# Patient Record
Sex: MALE | Race: WHITE | NOT HISPANIC OR LATINO | URBAN - METROPOLITAN AREA
[De-identification: names, ages, dates, MRNs, and addresses within clinical notes are randomized per-mention and may not be internally consistent; named-entity substitution may affect disease eponyms.]

---

## 2018-08-02 ENCOUNTER — OFFICE VISIT (OUTPATIENT)
Dept: FAMILY MEDICINE CLINIC | Facility: CLINIC | Age: 28
End: 2018-08-02

## 2018-08-02 VITALS
HEART RATE: 63 BPM | WEIGHT: 149.3 LBS | SYSTOLIC BLOOD PRESSURE: 124 MMHG | DIASTOLIC BLOOD PRESSURE: 78 MMHG | HEIGHT: 70 IN | BODY MASS INDEX: 21.37 KG/M2 | TEMPERATURE: 98.5 F | OXYGEN SATURATION: 99 %

## 2018-08-02 DIAGNOSIS — Z00.00 HEALTH CARE MAINTENANCE: ICD-10-CM

## 2018-08-02 DIAGNOSIS — J45.20 MILD INTERMITTENT ASTHMATIC BRONCHITIS WITHOUT COMPLICATION: Primary | ICD-10-CM

## 2018-08-02 PROCEDURE — 99203 OFFICE O/P NEW LOW 30 MIN: CPT | Performed by: FAMILY MEDICINE

## 2018-08-02 RX ORDER — ALBUTEROL SULFATE 90 UG/1
2 AEROSOL, METERED RESPIRATORY (INHALATION) EVERY 4 HOURS PRN
Qty: 1 INHALER | Refills: 2 | Status: SHIPPED | OUTPATIENT
Start: 2018-08-02 | End: 2018-10-26

## 2018-08-02 RX ORDER — PREDNISONE 20 MG/1
40 TABLET ORAL DAILY
Qty: 10 TABLET | Refills: 0 | Status: SHIPPED | OUTPATIENT
Start: 2018-08-02 | End: 2018-10-26

## 2018-08-02 NOTE — PROGRESS NOTES
"Subjective   Elijah Fisher is a 27 y.o. male.     Chief Complaint   Patient presents with   • Establish Care     reestablish care    • URI     x sat         History of Present Illness    Bronchitis with dry cough and tightness in the chest since about 5 days ago.  No fever.  No sore throat now but may be then.  He's been exposed to a lot of dust.  Also he was cleaning his house of fleas using a \"bug bomb\".  He does smoke 2 cigarettes a day.  No history of asthma.  Otherwise feeling well.  Overall feeling low but better now than a few days ago.      The following portions of the patient's history were reviewed and updated as appropriate: allergies, current medications, past family history, past medical history, past social history, past surgical history and problem list.          Review of Systems   Constitutional: Negative.    HENT: Negative.    Respiratory: Positive for cough and wheezing. Negative for shortness of breath.    Cardiovascular: Negative.    Musculoskeletal: Negative.        Objective   Blood pressure 124/78, pulse 63, temperature 98.5 °F (36.9 °C), temperature source Oral, height 177.8 cm (70\"), weight 67.7 kg (149 lb 4.8 oz), SpO2 99 %.  Physical Exam   Constitutional: No distress.   No acute distress.  Nontoxic.   HENT:   Right Ear: Tympanic membrane, external ear and ear canal normal.   Left Ear: Tympanic membrane, external ear and ear canal normal.   Nose: Nose normal.   Mouth/Throat: Oropharynx is clear and moist. No oropharyngeal exudate.   Eyes: Conjunctivae are normal. Right eye exhibits no discharge. Left eye exhibits no discharge. No scleral icterus.   Cardiovascular: Normal rate.    Pulmonary/Chest: Effort normal. No stridor. No respiratory distress. He has wheezes. He has no rales.   No tachypnea.  Mild expiratory wheeze with good air movement   Lymphadenopathy:     He has no cervical adenopathy.   Skin: No rash noted.   Nursing note and vitals reviewed.      Assessment/Plan   Elijah was seen " today for establish care and uri.    Diagnoses and all orders for this visit:    Mild intermittent asthmatic bronchitis without complication    Health care maintenance  -     CBC & Differential; Future  -     Comprehensive Metabolic Panel; Future  -     Lipid Panel; Future    Other orders  -     predniSONE (DELTASONE) 20 MG tablet; Take 2 tablets by mouth Daily.  -     albuterol (PROAIR HFA) 108 (90 Base) MCG/ACT inhaler; Inhale 2 puffs Every 4 (Four) Hours As Needed for Wheezing.      Asthmatic bronchitis.  Viral versus irritant.  Possible undiagnosed asthma.  Recommend smoking cessation.  Prednisone 40 mg a for 5 days.  Side effects of his own discussed.  Albuterol inhaler.  There is currently no evidence of pneumonia.  With worse symptoms seek medical attention immediately.  I'll see him back within 3 months for follow-up including complete physical examination.

## 2018-10-26 ENCOUNTER — OFFICE VISIT (OUTPATIENT)
Dept: FAMILY MEDICINE CLINIC | Facility: CLINIC | Age: 28
End: 2018-10-26

## 2018-10-26 VITALS
OXYGEN SATURATION: 100 % | BODY MASS INDEX: 21.42 KG/M2 | WEIGHT: 149.6 LBS | DIASTOLIC BLOOD PRESSURE: 74 MMHG | HEART RATE: 71 BPM | HEIGHT: 70 IN | SYSTOLIC BLOOD PRESSURE: 125 MMHG | TEMPERATURE: 98 F

## 2018-10-26 DIAGNOSIS — M67.479 GANGLION CYST OF FOOT: Primary | ICD-10-CM

## 2018-10-26 PROCEDURE — 99213 OFFICE O/P EST LOW 20 MIN: CPT | Performed by: FAMILY MEDICINE

## 2018-10-26 NOTE — PROGRESS NOTES
"Subjective   Elijah Fisher is a 28 y.o. male.     Chief Complaint   Patient presents with   • Foot Pain     on the top of right foot and sore x 2 wks        History of Present Illness    Right foot.  2 or 3 weeks.  A swollen area under the skin.  Then a blister on the skin.  Rubbing on his hiking boot.  He [place] a lot of hiking in the park.  He has not had this before.  No injury.  No swollen areas elsewhere.  No fever.  He used some mole skin around it and it got better.  He stopped the bandage and got worse      The following portions of the patient's history were reviewed and updated as appropriate: allergies, current medications, past family history, past medical history, past social history, past surgical history and problem list.          Review of Systems   Constitutional: Negative for fever.   Musculoskeletal: Negative for joint swelling.   Neurological: Negative for weakness and numbness.       Objective   Blood pressure 125/74, pulse 71, temperature 98 °F (36.7 °C), temperature source Oral, height 177.8 cm (70\"), weight 67.9 kg (149 lb 9.6 oz), SpO2 100 %.  Physical Exam   Constitutional: No distress.   Musculoskeletal:   Right midfoot dorsal, near the metatarsal mid foot joint, there is a three-quarter centimeter to 1 cm subcutaneous freely movable cystic-like nodule.  There is overlying abrasion and blister from rubbing on his boot.       Assessment/Plan   Elijah was seen today for foot pain.    Diagnoses and all orders for this visit:    Ganglion cyst of foot      Ganglion cyst right foot.  Overlying blister from abrasion.  I recommend he continue to use the Mole skin cut out bandage.  Should spontaneously improved.  If not, I gave the patient a name of a podiatrist to get to.  He will call with questions         "

## 2018-10-31 ENCOUNTER — RESULTS ENCOUNTER (OUTPATIENT)
Dept: FAMILY MEDICINE CLINIC | Facility: CLINIC | Age: 28
End: 2018-10-31

## 2018-10-31 DIAGNOSIS — Z00.00 HEALTH CARE MAINTENANCE: ICD-10-CM

## 2019-02-22 LAB
ALBUMIN SERPL-MCNC: 5.1 G/DL (ref 3.5–5.5)
ALBUMIN/GLOB SERPL: 2.2 {RATIO} (ref 1.2–2.2)
ALP SERPL-CCNC: 67 IU/L (ref 39–117)
ALT SERPL-CCNC: 31 IU/L (ref 0–44)
AST SERPL-CCNC: 22 IU/L (ref 0–40)
BASOPHILS # BLD AUTO: 0 X10E3/UL (ref 0–0.2)
BASOPHILS NFR BLD AUTO: 1 %
BILIRUB SERPL-MCNC: 0.7 MG/DL (ref 0–1.2)
BUN SERPL-MCNC: 14 MG/DL (ref 6–20)
BUN/CREAT SERPL: 16 (ref 9–20)
CALCIUM SERPL-MCNC: 9.7 MG/DL (ref 8.7–10.2)
CHLORIDE SERPL-SCNC: 102 MMOL/L (ref 96–106)
CHOLEST SERPL-MCNC: 200 MG/DL (ref 100–199)
CO2 SERPL-SCNC: 22 MMOL/L (ref 20–29)
CREAT SERPL-MCNC: 0.89 MG/DL (ref 0.76–1.27)
EOSINOPHIL # BLD AUTO: 0.2 X10E3/UL (ref 0–0.4)
EOSINOPHIL NFR BLD AUTO: 2 %
ERYTHROCYTE [DISTWIDTH] IN BLOOD BY AUTOMATED COUNT: 12.8 % (ref 12.3–15.4)
GLOBULIN SER CALC-MCNC: 2.3 G/DL (ref 1.5–4.5)
GLUCOSE SERPL-MCNC: 80 MG/DL (ref 65–99)
HCT VFR BLD AUTO: 46.7 % (ref 37.5–51)
HDLC SERPL-MCNC: 64 MG/DL
HGB BLD-MCNC: 15.8 G/DL (ref 13–17.7)
IMM GRANULOCYTES # BLD AUTO: 0 X10E3/UL (ref 0–0.1)
IMM GRANULOCYTES NFR BLD AUTO: 0 %
LDLC SERPL CALC-MCNC: 125 MG/DL (ref 0–99)
LYMPHOCYTES # BLD AUTO: 2.9 X10E3/UL (ref 0.7–3.1)
LYMPHOCYTES NFR BLD AUTO: 41 %
MCH RBC QN AUTO: 30.9 PG (ref 26.6–33)
MCHC RBC AUTO-ENTMCNC: 33.8 G/DL (ref 31.5–35.7)
MCV RBC AUTO: 91 FL (ref 79–97)
MONOCYTES # BLD AUTO: 0.4 X10E3/UL (ref 0.1–0.9)
MONOCYTES NFR BLD AUTO: 5 %
NEUTROPHILS # BLD AUTO: 3.5 X10E3/UL (ref 1.4–7)
NEUTROPHILS NFR BLD AUTO: 51 %
PLATELET # BLD AUTO: 277 X10E3/UL (ref 150–379)
POTASSIUM SERPL-SCNC: 4.3 MMOL/L (ref 3.5–5.2)
PROT SERPL-MCNC: 7.4 G/DL (ref 6–8.5)
RBC # BLD AUTO: 5.12 X10E6/UL (ref 4.14–5.8)
SODIUM SERPL-SCNC: 141 MMOL/L (ref 134–144)
TRIGL SERPL-MCNC: 55 MG/DL (ref 0–149)
VLDLC SERPL CALC-MCNC: 11 MG/DL (ref 5–40)
WBC # BLD AUTO: 7 X10E3/UL (ref 3.4–10.8)

## 2019-03-13 ENCOUNTER — TELEPHONE (OUTPATIENT)
Dept: FAMILY MEDICINE CLINIC | Facility: CLINIC | Age: 29
End: 2019-03-13

## 2019-03-13 NOTE — TELEPHONE ENCOUNTER
Pt was to see you last week but we was running behind and he had to leave. He said that he is working and lives over at [place] and wondering if you knew of another PCP in that area. Please advise

## 2019-11-08 ENCOUNTER — OFFICE VISIT (OUTPATIENT)
Dept: FAMILY MEDICINE CLINIC | Facility: CLINIC | Age: 29
End: 2019-11-08

## 2019-11-08 VITALS
WEIGHT: 152.8 LBS | TEMPERATURE: 97.1 F | HEIGHT: 70 IN | SYSTOLIC BLOOD PRESSURE: 143 MMHG | BODY MASS INDEX: 21.88 KG/M2 | OXYGEN SATURATION: 99 % | HEART RATE: 89 BPM | DIASTOLIC BLOOD PRESSURE: 85 MMHG

## 2019-11-08 DIAGNOSIS — R30.0 DYSURIA: Primary | ICD-10-CM

## 2019-11-08 LAB
BILIRUB BLD-MCNC: NEGATIVE MG/DL
CLARITY, POC: CLEAR
COLOR UR: YELLOW
GLUCOSE UR STRIP-MCNC: NEGATIVE MG/DL
KETONES UR QL: NEGATIVE
LEUKOCYTE EST, POC: NEGATIVE
NITRITE UR-MCNC: NEGATIVE MG/ML
PH UR: 7.5 [PH] (ref 5–8)
PROT UR STRIP-MCNC: ABNORMAL MG/DL
RBC # UR STRIP: NEGATIVE /UL
SP GR UR: 1.01 (ref 1–1.03)
UROBILINOGEN UR QL: NORMAL

## 2019-11-08 PROCEDURE — 99213 OFFICE O/P EST LOW 20 MIN: CPT | Performed by: FAMILY MEDICINE

## 2019-11-08 PROCEDURE — 81003 URINALYSIS AUTO W/O SCOPE: CPT | Performed by: FAMILY MEDICINE

## 2019-11-08 RX ORDER — DOXYCYCLINE HYCLATE 100 MG/1
100 CAPSULE ORAL 2 TIMES DAILY
Qty: 20 CAPSULE | Refills: 0 | Status: SHIPPED | OUTPATIENT
Start: 2019-11-08 | End: 2020-07-02

## 2019-11-08 NOTE — PROGRESS NOTES
"Subjective   Elijah Fisher is a 29 y.o. male.     Chief Complaint   Patient presents with   • Dysuria     x sun when he urinates having discomfort         History of Present Illness    Minor irritation with urination, urethra towards the tip of the penis, for about 5 days.  It was a little bit worse yesterday.  No risk factors for STDs.  He states [place] cabin and was in a hot tub prior to the symptoms.  He has no rash anywhere.  Including on his legs.  No illness no fever some frequency of urination but no urgency.  No troubles urinating.  No back pain or flank pain.  No photophobia.  No arthritis symptoms.  No rashes.  Mild nuisance level symptoms except for yesterday.  He states he has no risk factors for sexually transmitted diseases.      The following portions of the patient's history were reviewed and updated as appropriate: allergies, current medications, past family history, past medical history, past social history, past surgical history and problem list.          Review of Systems   Constitutional: Negative for fever.   Genitourinary: Positive for dysuria. Negative for hematuria.   Musculoskeletal: Negative for back pain.   Skin: Negative.        Objective   Blood pressure 143/85, pulse 89, temperature 97.1 °F (36.2 °C), temperature source Oral, height 177.8 cm (70\"), weight 69.3 kg (152 lb 12.8 oz), SpO2 99 %.  Physical Exam   Constitutional: No distress.   Genitourinary:   Genitourinary Comments: No testicular nodules or masses.  Epididymides nontender noninflamed.  No scrotal lesions.  There is a small sebaceous cyst, 3 or 4 mm, patient states is been there for quite some time in the right scrotum.  No hernia.  No adenopathy.  The glans penis is unremarkable.  There is no purulent drainage.  There is no urethral inflammation.     Urinalysis negative except for trace protein    Assessment/Plan   Elijah was seen today for dysuria.    Diagnoses and all orders for this visit:    Dysuria  -     POC " Urinalysis Dipstick, Automated  -     Chlamydia trachomatis, Neisseria gonorrhoeae, PCR - Urine, Urine, Clean Catch; Future  -     Chlamydia trachomatis, Neisseria gonorrhoeae, PCR - Urine, Urine, Clean Catch    Other orders  -     doxycycline (VIBRAMYCIN) 100 MG capsule; Take 1 capsule by mouth 2 (Two) Times a Day.      Nonspecific urethritis.  Minimal symptoms.  Slowly improving.  At this time I recommend observation.  Checking urine for gonorrhea and chlamydia.  If the symptoms get worse over the weekend start doxycycline.  If chlamydia positive start doxycycline.  If gonorrhea positive needs recheck and I am Rocephin.  Low risk for STD.  No evidence of yeast balanitis or urethritis.  Possibly related to irritation from the hot tub.  No evidence of hot tub folliculitis.  Plenty of fluids recommended.  Likely not renal lithiasis.

## 2019-11-11 LAB
C TRACH RRNA SPEC QL NAA+PROBE: NEGATIVE
N GONORRHOEA RRNA SPEC QL NAA+PROBE: NEGATIVE

## 2020-04-21 ENCOUNTER — TELEMEDICINE (OUTPATIENT)
Dept: FAMILY MEDICINE CLINIC | Facility: CLINIC | Age: 30
End: 2020-04-21

## 2020-04-21 DIAGNOSIS — K59.00 CONSTIPATION, UNSPECIFIED CONSTIPATION TYPE: Primary | ICD-10-CM

## 2020-04-21 PROCEDURE — 99213 OFFICE O/P EST LOW 20 MIN: CPT | Performed by: FAMILY MEDICINE

## 2020-04-21 NOTE — PROGRESS NOTES
Subjective   Elijah Fisher is a 29 y.o. male.     Chief Complaint   Patient presents with   • Constipation        History of Present Illness    Coronavirus pandemic telehealth visit.  Excellent audio and video connection.  Patient gave informed consent via the [employer] check in process.    Last week patient had some lower abdominal occasional discomfort.  Mild.  Will come and go.  He noted constipation throughout the week.  He eats a plant-based diet mostly.  There is been no major changes in his diet but he states he is not drinking enough water.  He otherwise has felt okay.  But then Sunday evening had more severe lower abdominal cramps.  No nausea.  No GERD symptoms.  No fever.  No chills.  No cough.  No shortness of breath.  No other concerning symptoms.  He took a magnesium laxative.  And then got cleaned out now is having loose stool not diarrhea for a couple of days.  But he feels overall much better.  No constitutional symptoms otherwise.  He is mostly concerned about the loose to watery stool now.  He has had a slight flareup of hemorrhoids since this began.  He is doing sitz bath's.      The following portions of the patient's history were reviewed and updated as appropriate: allergies, current medications, past family history, past medical history, past social history, past surgical history and problem list.          Review of Systems   Constitutional: Negative for fever.   HENT: Negative.    Respiratory: Negative.    Gastrointestinal: Positive for constipation and diarrhea.   Musculoskeletal: Negative.    Skin: Negative.    Neurological: Negative.    Psychiatric/Behavioral: Negative.        Objective   There were no vitals taken for this visit.  Physical Exam   Constitutional: He is oriented to person, place, and time. He appears well-nourished. No distress.   He appears in no acute distress.  Nontoxic.   HENT:   Head: Atraumatic.   Neck: Normal range of motion.   Pulmonary/Chest: Effort normal. No  respiratory distress.   Neurological: He is alert and oriented to person, place, and time. Coordination normal.   Skin: No pallor.   Psychiatric: He has a normal mood and affect.       Assessment/Plan   Elijah was seen today for constipation.    Diagnoses and all orders for this visit:    Constipation, unspecified constipation type      Constipation.  Resolved with a fairly powerful laxative.  Now having some loose stool, like related to the laxative.  I recommend plenty of fluids.  Continue his regular diet.  The looser stools should now dissipate.  If he develops any constitutional symptoms such as fever, chills, cough, or other symptoms he is going to let us know.  For the hemorrhoids I recommend sitz bath, Preparation H, or just Vaseline ointment.    Total duration of telehealth encounter 20 minutes.

## 2020-04-24 ENCOUNTER — TELEMEDICINE (OUTPATIENT)
Dept: FAMILY MEDICINE CLINIC | Facility: CLINIC | Age: 30
End: 2020-04-24

## 2020-04-24 DIAGNOSIS — R10.9 ABDOMINAL CRAMPS: Primary | ICD-10-CM

## 2020-04-24 PROCEDURE — 99213 OFFICE O/P EST LOW 20 MIN: CPT | Performed by: FAMILY MEDICINE

## 2020-04-24 NOTE — PATIENT INSTRUCTIONS
Per our conversation probable functional bowel disorder IBS-like symptoms.  I recommend a probiotic of choice.  Cut back the apples.  Okay for other fermented foods.  Symptoms are allowed to persist for about 3 weeks which should gradually get better.  With severe symptoms such as high fever, weight loss, or other concerning symptoms please let us know.

## 2020-04-24 NOTE — PROGRESS NOTES
Subjective   Elijah Fisher is a 29 y.o. male.     No chief complaint on file.    Chief complaint abdominal cramps    History of Present Illness     You have chosen to receive care through a telehealth visit.  Do you consent to use a video/audio connection for your medical care today? Yes    I saw the patient via telehealth earlier this week with some nonspecific constipation symptoms.  He had taken some magnesium citrate or something similar.  Got cleaned out pretty good and was having some looser stool it was concerning him.  There is been no changes in his diet other than he is been eating a lot of apples.  He drinks maybe a sixpack of beer a weekend but that is unchanged.  He has had some stressors but nothing considerable.  He had a hemorrhoid last week which is now better.  He is had no recurrent blood in the stool.  He has had some bloating and gassiness after eating.  Sometimes with healthy food.  At times it repeats and feels fine.  There is been no fevers or chills no night sweats no unexpected weight loss.  He otherwise feels well.  He has more significant lower cramps recently.      The following portions of the patient's history were reviewed and updated as appropriate: allergies, current medications, past family history, past medical history, past social history, past surgical history and problem list.          Review of Systems   Constitutional: Negative.    Gastrointestinal: Positive for constipation. Negative for blood in stool.   Psychiatric/Behavioral: Negative.        Objective   There were no vitals taken for this visit.  Physical Exam   Constitutional: He is oriented to person, place, and time.   The patient appears comfortable and no acute distress   HENT:   Head: Atraumatic.   Neck: Normal range of motion.   Pulmonary/Chest: Effort normal. No respiratory distress.   Neurological: He is alert and oriented to person, place, and time.   Skin: No pallor.   Psychiatric: He has a normal mood and  affect. His behavior is normal.       Assessment/Plan   Diagnoses and all orders for this visit:    Abdominal cramps      Intermittent abdominal cramps since use of magnesium for constipation.  Overall the symptoms are suggestive of irritable bowel syndrome or functional bowel disorder.  There is no red flag symptoms such as weight loss, severe blood in the stool, or other concerning symptoms.  At this time I do not see an absolute indication for work-up.  Less likely irritable bowel syndrome or celiac disease.  He has been eating a lot of apples.  I recommend a consideration lowering the amount of apples in his diet but he does not have to go to a low FODMAP diet.  I do recommend a probiotic of choice.  He can continue yogurt which he has not had trouble with in the past.  At this point symptoms should resolve within 3 weeks.  If they are getting worse or he has red flag symptoms such as weight loss, fever, severe pain, severe blood in the stool or other severe concerns go to let us know sooner.    Total duration of telehealth encounter approximately 15 minutes

## 2020-07-01 ENCOUNTER — TELEPHONE (OUTPATIENT)
Dept: FAMILY MEDICINE CLINIC | Facility: CLINIC | Age: 30
End: 2020-07-01

## 2020-07-01 NOTE — TELEPHONE ENCOUNTER
Prefer for appointment in office.  If he is having headaches but no URI symptoms, likely not COVID.

## 2020-07-01 NOTE — TELEPHONE ENCOUNTER
Patient is having headaches only when exercising and is causing some concern; because of headache he failed covid screening to come to an inpatient appt. Please call to advise at [phone]

## 2020-07-02 ENCOUNTER — OFFICE VISIT (OUTPATIENT)
Dept: FAMILY MEDICINE CLINIC | Facility: CLINIC | Age: 30
End: 2020-07-02

## 2020-07-02 VITALS
BODY MASS INDEX: 18.98 KG/M2 | HEART RATE: 107 BPM | TEMPERATURE: 97.8 F | SYSTOLIC BLOOD PRESSURE: 132 MMHG | HEIGHT: 70 IN | OXYGEN SATURATION: 97 % | DIASTOLIC BLOOD PRESSURE: 82 MMHG | WEIGHT: 132.6 LBS

## 2020-07-02 DIAGNOSIS — G44.84 PRIMARY EXERTIONAL HEADACHE: Primary | ICD-10-CM

## 2020-07-02 PROCEDURE — 99214 OFFICE O/P EST MOD 30 MIN: CPT | Performed by: FAMILY MEDICINE

## 2020-07-02 NOTE — PATIENT INSTRUCTIONS
Probable primary exertional headache.  Less likely early symptoms of a cerebral aneurysm.  However the standard of care for these type of headaches is CT angiography or MRI angiography.  I recommend CT angiography.  If you have recurrent symptoms I want to go directly to the emergency room.  However I think there is low probability of this being a life-threatening headache at this time.

## 2020-07-02 NOTE — PROGRESS NOTES
"Subjective   Elijah Fisher is a 29 y.o. male.     Chief Complaint   Patient presents with   • Headache     C/o of h/a while exercising, more serve during activity but has been on and off        History of Present Illness    In the last week 2 episodes of moderately to severe headache with exertion.  Right temporal.  Shooting to the right occiput.  Occurred both with intense exercise.  Once lifting weights doing biceps.  The other time doing push-ups.  However he had some other exertional activities outside of the gym that were similar but with only a mild headache.  Otherwise no headache.  He does get migraine headaches about twice year that are very severe and throbbing.  He states those are different than this.  This is sharp.  Comes on fairly slowly.  Then goes away slowly.  He has had no diplopia.  No visual change.  No visual loss.  No nausea.  No vomiting.  No focal neurological deficit.  No gait problems.  Some possible nocturnal symptoms.  No headache with sex.  He does smoke cigarettes.  Blood pressures been borderline elevated the office before.  There is no family history of cerebral aneurysm.  Patient is pain-free now.  He is anxious about this.      The following portions of the patient's history were reviewed and updated as appropriate: allergies, current medications, past family history, past medical history, past social history, past surgical history and problem list.          Review of Systems   Constitutional: Negative.    HENT: Negative for postnasal drip, sinus pressure and sinus pain.    Eyes: Negative.    Respiratory: Negative.    Cardiovascular: Negative.    Neurological: Positive for headaches. Negative for dizziness, tremors, seizures, syncope, facial asymmetry, speech difficulty, weakness, light-headedness and numbness.   Psychiatric/Behavioral: Negative.        Objective   Blood pressure 132/82, pulse 107, temperature 97.8 °F (36.6 °C), temperature source Tympanic, height 177.8 cm (70\"), " weight 60.1 kg (132 lb 9.6 oz), SpO2 97 %.  Physical Exam   Constitutional: He is oriented to person, place, and time. No distress.   No acute distress.  Nontoxic.   HENT:   Head: Head is with Sanchez's sign.   Right Ear: Tympanic membrane, external ear and ear canal normal.   Left Ear: Tympanic membrane, external ear and ear canal normal.   Nose: Nose normal.   Mouth/Throat: Oropharynx is clear and moist. No oropharyngeal exudate.   No tenderness palpation of the scalp.  No scalp lesions.  No neck discomfort to palpation   Eyes: Pupils are equal, round, and reactive to light. Conjunctivae are normal. Right eye exhibits no discharge. Left eye exhibits no discharge. No scleral icterus.   Cardiovascular: Normal rate.   Pulmonary/Chest: Effort normal and breath sounds normal. No stridor. No respiratory distress. He has no wheezes. He has no rales.   No tachypnea   Lymphadenopathy:     He has no cervical adenopathy.   Neurological: He is alert and oriented to person, place, and time. He exhibits normal muscle tone. Coordination normal.   Pupils equal and reactive to light.  Extraocular muscle intact all directions.  Neck supple full range of motion.  Face and tongue and palate midline.  Strength 5 out of 5 in the upper lower extremities.  Gait unremarkable.  No ataxia.  No dysmetria.   Skin: No rash noted.   Nursing note and vitals reviewed.      Assessment/Plan   Elijah was seen today for headache.    Diagnoses and all orders for this visit:    Primary exertional headache  -     CT angiogram head w wo contrast; Future      Headache.  Suggestive of primary exertional headache.  Standard of care for work-up of these type of headaches is angiography either MRI or CT.  I prefer CT.  Nonurgent CT scan ordered.  Patient should avoid intense exercise until we have the test results.  He understands with recurrent severe symptoms he is to go directly to the emergency room for evaluation.  He will call with questions.  He will  contact us if he does not hear from us within 24 hours of the CT scan.

## 2020-07-09 ENCOUNTER — TELEPHONE (OUTPATIENT)
Dept: FAMILY MEDICINE CLINIC | Facility: CLINIC | Age: 30
End: 2020-07-09

## 2020-07-09 NOTE — TELEPHONE ENCOUNTER
CT head wo contrast has been routed to  for review.    Pls call pt w/results.     Pt can be reached #[phone].

## 2020-07-09 NOTE — TELEPHONE ENCOUNTER
PT CALLING ABOUT RESULTS OF CT SCAN FROM YESTERDAY. PT STATES HE HAS EXTREME ANXIETY ABOUT THIS AND WOULD LIKE A CALL ASAP.

## 2020-07-09 NOTE — TELEPHONE ENCOUNTER
I spoke to the patient and informed him that I did not see the results in the chart yet. He said that they told him that it should have been faxed over today, possibly even could have been faxed yesterday.  I told the patient that I would check the status of this.  Explained that I would call him with a status update, or someone would be in touch with the results if we did in fact receive the results  He expressed understanding.  I just wanted to make sure I called him about this since he was anxious about it.

## 2020-07-09 NOTE — TELEPHONE ENCOUNTER
I had Jessica check and it was not in the faxes.  The patient did say that he was going to call over there again and have them re-fax the results.  I will check on the status tomorrow.

## 2020-07-09 NOTE — TELEPHONE ENCOUNTER
The patient had them fax it again and [facility] received it and routed it to you.  Please review CT results and advise.

## 2020-07-10 RX ORDER — SULFAMETHOXAZOLE AND TRIMETHOPRIM 800; 160 MG/1; MG/1
1 TABLET ORAL 2 TIMES DAILY
Qty: 10 TABLET | Refills: 0 | Status: SHIPPED | OUTPATIENT
Start: 2020-07-10 | End: 2021-03-15

## 2020-07-10 NOTE — PROGRESS NOTES
Per our conversation, the CT scan had reveals no significant abnormality.  No evidence of bleeding.  There is no evidence of right sphenoid sinusitis.  This could be causing the headaches.  Per our discussion I am prescribing antibiotics for 5 days.  Sent to your pharmacy.  If headaches return please let us know.  With very severe headache seek medical attention immediately.  Otherwise call with questions.

## 2020-07-10 NOTE — PROGRESS NOTES
Phone call with patient. CT scan no bleed. Probable sinusitis. Could easily be cause of HA. Will treat with abx. Pt aware that if sx return, he should seek medical attention.

## 2021-03-15 ENCOUNTER — OFFICE VISIT (OUTPATIENT)
Dept: FAMILY MEDICINE CLINIC | Facility: CLINIC | Age: 31
End: 2021-03-15

## 2021-03-15 VITALS
HEIGHT: 70 IN | OXYGEN SATURATION: 100 % | WEIGHT: 143.2 LBS | HEART RATE: 62 BPM | BODY MASS INDEX: 20.5 KG/M2 | SYSTOLIC BLOOD PRESSURE: 145 MMHG | TEMPERATURE: 99.3 F | RESPIRATION RATE: 16 BRPM | DIASTOLIC BLOOD PRESSURE: 84 MMHG

## 2021-03-15 DIAGNOSIS — Z00.00 ANNUAL PHYSICAL EXAM: ICD-10-CM

## 2021-03-15 DIAGNOSIS — D17.21 LIPOMA OF RIGHT UPPER EXTREMITY: Primary | ICD-10-CM

## 2021-03-15 DIAGNOSIS — Z00.00 HEALTHCARE MAINTENANCE: Primary | ICD-10-CM

## 2021-03-15 PROCEDURE — 99213 OFFICE O/P EST LOW 20 MIN: CPT | Performed by: NURSE PRACTITIONER

## 2021-03-15 NOTE — PROGRESS NOTES
"Chief Complaint  Mass (under his right shoulder arm pit area couple of weeks ago.)    Subjective          Elijah Fisher presents to [facility]  History of Present Illness   New patient to me.  He is here for a couple weeks of right anterior upper arm mass.  He noticed it when he was lifting weights.  It is not painful.  He reports he may have had it longer does not noticed it.  He does not have any other masses in other locations.  He reports it is \"soft and squishy\" and mobile.    He is in need of a physical and is planning to schedule physical with PCP soon.    He denies other health complaints today.      Objective   Vital Signs:   /84   Pulse 62   Temp 99.3 °F (37.4 °C) (Temporal)   Resp 16   Ht 177.8 cm (70\")   Wt 65 kg (143 lb 3.2 oz)   SpO2 100%   BMI 20.55 kg/m²     Physical Exam  Vitals and nursing note reviewed.   Constitutional:       General: He is not in acute distress.     Appearance: He is well-developed. He is not ill-appearing or diaphoretic.   HENT:      Head: Normocephalic and atraumatic.   Eyes:      General:         Right eye: No discharge.         Left eye: No discharge.      Conjunctiva/sclera: Conjunctivae normal.   Cardiovascular:      Rate and Rhythm: Normal rate and regular rhythm.   Pulmonary:      Effort: Pulmonary effort is normal.      Breath sounds: Normal breath sounds.   Abdominal:      General: Bowel sounds are normal.      Palpations: Abdomen is soft.      Tenderness: There is no abdominal tenderness.   Musculoskeletal:         General: No deformity.      Comments: Appears to have a lipoma on his right upper anterior extremity about 3 finger widths below clavicle.  It is mobile, soft, nontender.  It is not erythematous, does not feel like a lymph node.  He has no axillary, supraclavicular or antecubital lymph nodes palpable.  No arm swelling or pain and does not limit his range of motion.     Skin:     General: Skin is warm and dry. "   Neurological:      General: No focal deficit present.      Mental Status: He is alert and oriented to person, place, and time.   Psychiatric:         Mood and Affect: Mood normal.        Result Review :                 Assessment and Plan    Diagnoses and all orders for this visit:    1. Lipoma of right upper extremity (Primary)       Appears to have a lipoma of his right upper extremity.  We discussed this and monitoring.  He has a follow-up with his PCP in about a month and have asked patient to have it rechecked while here.  PSA ultrasound may be able to provide additional assessment.            Follow Up   Return if symptoms worsen or fail to improve.  Patient was given instructions and counseling regarding his condition or for health maintenance advice. Please see specific information pulled into the AVS if appropriate.

## 2021-03-16 LAB
ALBUMIN SERPL-MCNC: 4.8 G/DL (ref 3.5–5.2)
ALBUMIN/GLOB SERPL: 2.1 G/DL
ALP SERPL-CCNC: 59 U/L (ref 39–117)
ALT SERPL-CCNC: 19 U/L (ref 1–41)
AST SERPL-CCNC: 18 U/L (ref 1–40)
BASOPHILS # BLD AUTO: 0.06 10*3/MM3 (ref 0–0.2)
BASOPHILS NFR BLD AUTO: 1.2 % (ref 0–1.5)
BILIRUB SERPL-MCNC: 0.7 MG/DL (ref 0–1.2)
BUN SERPL-MCNC: 14 MG/DL (ref 6–20)
BUN/CREAT SERPL: 16.3 (ref 7–25)
CALCIUM SERPL-MCNC: 9.5 MG/DL (ref 8.6–10.5)
CHLORIDE SERPL-SCNC: 101 MMOL/L (ref 98–107)
CHOLEST SERPL-MCNC: 155 MG/DL (ref 0–200)
CO2 SERPL-SCNC: 26.2 MMOL/L (ref 22–29)
CREAT SERPL-MCNC: 0.86 MG/DL (ref 0.76–1.27)
EOSINOPHIL # BLD AUTO: 0.13 10*3/MM3 (ref 0–0.4)
EOSINOPHIL NFR BLD AUTO: 2.5 % (ref 0.3–6.2)
ERYTHROCYTE [DISTWIDTH] IN BLOOD BY AUTOMATED COUNT: 11.6 % (ref 12.3–15.4)
GLOBULIN SER CALC-MCNC: 2.3 GM/DL
GLUCOSE SERPL-MCNC: 100 MG/DL (ref 65–99)
HCT VFR BLD AUTO: 43.7 % (ref 37.5–51)
HCV AB S/CO SERPL IA: <0.1 S/CO RATIO (ref 0–0.9)
HDLC SERPL-MCNC: 61 MG/DL (ref 40–60)
HGB BLD-MCNC: 14.9 G/DL (ref 13–17.7)
IMM GRANULOCYTES # BLD AUTO: 0.01 10*3/MM3 (ref 0–0.05)
IMM GRANULOCYTES NFR BLD AUTO: 0.2 % (ref 0–0.5)
LDLC SERPL CALC-MCNC: 78 MG/DL (ref 0–100)
LYMPHOCYTES # BLD AUTO: 1.88 10*3/MM3 (ref 0.7–3.1)
LYMPHOCYTES NFR BLD AUTO: 36.2 % (ref 19.6–45.3)
MCH RBC QN AUTO: 31 PG (ref 26.6–33)
MCHC RBC AUTO-ENTMCNC: 34.1 G/DL (ref 31.5–35.7)
MCV RBC AUTO: 91 FL (ref 79–97)
MONOCYTES # BLD AUTO: 0.33 10*3/MM3 (ref 0.1–0.9)
MONOCYTES NFR BLD AUTO: 6.4 % (ref 5–12)
NEUTROPHILS # BLD AUTO: 2.78 10*3/MM3 (ref 1.7–7)
NEUTROPHILS NFR BLD AUTO: 53.5 % (ref 42.7–76)
NRBC BLD AUTO-RTO: 0 /100 WBC (ref 0–0.2)
PLATELET # BLD AUTO: 257 10*3/MM3 (ref 140–450)
POTASSIUM SERPL-SCNC: 4.6 MMOL/L (ref 3.5–5.2)
PROT SERPL-MCNC: 7.1 G/DL (ref 6–8.5)
RBC # BLD AUTO: 4.8 10*6/MM3 (ref 4.14–5.8)
SODIUM SERPL-SCNC: 140 MMOL/L (ref 136–145)
TRIGL SERPL-MCNC: 87 MG/DL (ref 0–150)
VLDLC SERPL CALC-MCNC: 16 MG/DL (ref 5–40)
WBC # BLD AUTO: 5.19 10*3/MM3 (ref 3.4–10.8)

## 2021-04-05 ENCOUNTER — OFFICE VISIT (OUTPATIENT)
Dept: FAMILY MEDICINE CLINIC | Facility: CLINIC | Age: 31
End: 2021-04-05

## 2021-04-05 VITALS
DIASTOLIC BLOOD PRESSURE: 72 MMHG | BODY MASS INDEX: 20.22 KG/M2 | TEMPERATURE: 99.3 F | HEART RATE: 62 BPM | OXYGEN SATURATION: 100 % | WEIGHT: 141.2 LBS | HEIGHT: 70 IN | SYSTOLIC BLOOD PRESSURE: 125 MMHG

## 2021-04-05 DIAGNOSIS — Z00.00 HEALTH CARE MAINTENANCE: Primary | ICD-10-CM

## 2021-04-05 PROCEDURE — 99395 PREV VISIT EST AGE 18-39: CPT | Performed by: FAMILY MEDICINE

## 2021-04-05 NOTE — PROGRESS NOTES
"Chief Complaint  Annual Exam    Subjective          Elijah Fisher presents to [facility]  History of Present Illness    Here for annual wellness visit.  Has been exercising regularly.  He smokes about 10 to 20 cigarettes on the weekends only.  He rolls his own.  No marijuana use.  Minimal alcohol use.  He has no particular complaints of his health except some creaking knees but no pain.  Review of systems otherwise negative.  No dysphagia.  No change in stool.  No change in urine.  No hematuria.  No blood in the stool.  We reviewed his lipid panel.  Overall excellent.  Her main of the lab work within normal limits.  He is Up-to-date on his COVID-19 vaccinations.    Objective   Vital Signs:   /72   Pulse 62   Temp 99.3 °F (37.4 °C) (Temporal)   Ht 177.8 cm (70\")   Wt 64 kg (141 lb 3.2 oz)   SpO2 100%   BMI 20.26 kg/m²     Physical Exam  Constitutional:       Appearance: Normal appearance.   HENT:      Head: Atraumatic.   Eyes:      Conjunctiva/sclera: Conjunctivae normal.   Cardiovascular:      Rate and Rhythm: Normal rate and regular rhythm.      Pulses: Normal pulses.      Heart sounds: Normal heart sounds.   Pulmonary:      Effort: Pulmonary effort is normal.      Breath sounds: Normal breath sounds.   Abdominal:      General: Abdomen is flat. There is no distension.      Palpations: Abdomen is soft. There is no mass.      Tenderness: There is no abdominal tenderness.      Hernia: No hernia is present.   Musculoskeletal:         General: Normal range of motion.      Cervical back: Normal range of motion and neck supple. No muscular tenderness.      Comments: At the right deltoid/pectoralis tendon there is a subdermal half millimeter freely movable rubbery nodule consistent with a small lipoma or possible tendinous cyst.  Patient states he noticed it while lifting weights the other day.   Lymphadenopathy:      Cervical: No cervical adenopathy.   Skin:     General: Skin is " warm and dry.      Findings: No rash.   Neurological:      General: No focal deficit present.      Mental Status: He is alert and oriented to person, place, and time.   Psychiatric:         Mood and Affect: Mood normal.         Behavior: Behavior normal.        Result Review :   The following data was reviewed by: Jean-Pierre Garcia MD on 04/05/2021:  Common labs    Common Labsle 3/15/21 3/15/21 3/15/21    1237 1237 1237   Glucose  100 (A)    BUN  14    Creatinine  0.86    eGFR Non  Am  104    eGFR African Am  126    Sodium  140    Potassium  4.6    Chloride  101    Calcium  9.5    Total Protein  7.1    Albumin  4.80    Total Bilirubin  0.7    Alkaline Phosphatase  59    AST (SGOT)  18    ALT (SGPT)  19    WBC 5.19     Hemoglobin 14.9     Hematocrit 43.7     Platelets 257     Total Cholesterol   155   Triglycerides   87   HDL Cholesterol   61 (A)   LDL Cholesterol    78   (A) Abnormal value       Comments are available for some flowsheets but are not being displayed.                     Assessment and Plan    Diagnoses and all orders for this visit:    1. Health care maintenance (Primary)      Annual health care maintenance visit.    Immunizations up-to-date.    Small lipoma right arm.  Will observe.  If it gets much bigger or otherwise bothersome she is going to let us know.    Preventative health practices discussed.  Smoking cessation discussed.  Continue regular exercise.  I will see him back in 12 months.  Sooner as needed.      Follow Up   No follow-ups on file.  Patient was given instructions and counseling regarding his condition or for health maintenance advice. Please see specific information pulled into the AVS if appropriate.

## 2021-07-27 ENCOUNTER — TELEPHONE (OUTPATIENT)
Dept: FAMILY MEDICINE CLINIC | Facility: CLINIC | Age: 31
End: 2021-07-27

## 2021-07-27 NOTE — TELEPHONE ENCOUNTER
Pt is requesting a tdap vaccine. Wife is pregnant. Want to get done on Fri. 7/30/21 if possible.    Pls advise.    Pt can be reached #[phone].

## 2021-07-28 NOTE — TELEPHONE ENCOUNTER
LEFT PT VOICEMAIL TO GIVE THE OFFICE A CALL BACK TO SCHEDULE TDAP NURSE SCHEDULE APPT FOR Friday.

## 2021-07-30 ENCOUNTER — CLINICAL SUPPORT (OUTPATIENT)
Dept: FAMILY MEDICINE CLINIC | Facility: CLINIC | Age: 31
End: 2021-07-30

## 2021-07-30 DIAGNOSIS — Z23 NEED FOR VACCINATION: Primary | ICD-10-CM

## 2021-07-30 PROCEDURE — 90471 IMMUNIZATION ADMIN: CPT | Performed by: FAMILY MEDICINE

## 2021-07-30 PROCEDURE — 90715 TDAP VACCINE 7 YRS/> IM: CPT | Performed by: FAMILY MEDICINE

## 2021-08-24 ENCOUNTER — OFFICE VISIT (OUTPATIENT)
Dept: FAMILY MEDICINE CLINIC | Facility: CLINIC | Age: 31
End: 2021-08-24

## 2021-08-24 VITALS
HEIGHT: 70 IN | OXYGEN SATURATION: 100 % | BODY MASS INDEX: 19.69 KG/M2 | DIASTOLIC BLOOD PRESSURE: 75 MMHG | SYSTOLIC BLOOD PRESSURE: 125 MMHG | HEART RATE: 58 BPM | TEMPERATURE: 97.5 F | WEIGHT: 137.5 LBS

## 2021-08-24 DIAGNOSIS — R10.84 GENERALIZED ABDOMINAL PAIN: Primary | ICD-10-CM

## 2021-08-24 PROCEDURE — 99213 OFFICE O/P EST LOW 20 MIN: CPT | Performed by: FAMILY MEDICINE

## 2021-08-24 NOTE — PROGRESS NOTES
"Answers for HPI/ROS submitted by the patient on 8/23/2021  What is the primary reason for your visit?: Other  Please describe your symptoms.: Prolonged, lower abdominal bloating.  Have you had these symptoms before?: Yes  How long have you been having these symptoms?: 1-2 weeks  Please list any medications you are currently taking for this condition.: Anti-gas tablets.    Chief Complaint  Bloated (x 9 days )    Subjective          Elijah Fisher presents to [facility]  History of Present Illness     About 9 or 10 days of abdominal bloating that was uncomfortable at times.  Is been a slight change in his diet.  He has been eating an apple every morning as a midmorning snack on empty stomach.  He usually does not eat breakfast.  He has had occasional bright red blood with hemorrhoids has had this before.  On the tissue paper but not in the stool.  Otherwise her stools are unchanged.  Is been better over the last couple days since he cut back the Stevia sweetener he was using and also stopped eating apples.  He has history of IBS symptoms.  There is no family history of colon cancer in close relatives.  He has had some stressors.  Baby due in about a month and half.  No fever.  No weight loss.  He has good energy level.  Able to exercise without trouble    Objective   Vital Signs:   /75   Pulse 58   Temp 97.5 °F (36.4 °C) (Temporal)   Ht 177.8 cm (70\")   Wt 62.4 kg (137 lb 8 oz)   SpO2 100%   BMI 19.73 kg/m²     Physical Exam  Constitutional:       Appearance: Normal appearance.   Cardiovascular:      Rate and Rhythm: Normal rate.      Pulses: Normal pulses.   Pulmonary:      Effort: Pulmonary effort is normal.   Abdominal:      General: Abdomen is flat. Bowel sounds are normal. There is no distension.      Palpations: Abdomen is soft. There is no mass.      Tenderness: There is no abdominal tenderness. There is no guarding or rebound.      Hernia: No hernia is present. "   Skin:     General: Skin is warm and dry.   Neurological:      Mental Status: He is alert.   Psychiatric:         Mood and Affect: Mood normal.         Behavior: Behavior normal.        Result Review :                 Assessment and Plan    Diagnoses and all orders for this visit:    1. Generalized abdominal pain (Primary)      Gassy abdominal pain, resolved.  Like related to fermentation from the apples plus IBS symptoms.  He does have a known history of previous hemorrhoids at flareup now and then.  His symptoms are now nearly resolved.  If symptoms return or otherwise become problematic he is going to return for further evaluation.  Otherwise I will see him back as scheduled.  It will be reasonable to stay away from apples.      Follow Up   No follow-ups on file.  Patient was given instructions and counseling regarding his condition or for health maintenance advice. Please see specific information pulled into the AVS if appropriate.

## 2021-08-30 DIAGNOSIS — R10.84 GENERALIZED ABDOMINAL PAIN: ICD-10-CM

## 2021-08-30 DIAGNOSIS — R10.84 GENERALIZED ABDOMINAL PAIN: Primary | ICD-10-CM

## 2021-09-01 LAB
ALBUMIN SERPL-MCNC: 5 G/DL (ref 3.5–5.2)
ALBUMIN/GLOB SERPL: 2.6 G/DL
ALP SERPL-CCNC: 59 U/L (ref 39–117)
ALT SERPL-CCNC: 20 U/L (ref 1–41)
AST SERPL-CCNC: 19 U/L (ref 1–40)
BASOPHILS # BLD AUTO: 0.06 10*3/MM3 (ref 0–0.2)
BASOPHILS NFR BLD AUTO: 0.8 % (ref 0–1.5)
BILIRUB SERPL-MCNC: 0.6 MG/DL (ref 0–1.2)
BUN SERPL-MCNC: 13 MG/DL (ref 6–20)
BUN/CREAT SERPL: 14.9 (ref 7–25)
CALCIUM SERPL-MCNC: 9.2 MG/DL (ref 8.6–10.5)
CHLORIDE SERPL-SCNC: 104 MMOL/L (ref 98–107)
CO2 SERPL-SCNC: 25.8 MMOL/L (ref 22–29)
CREAT SERPL-MCNC: 0.87 MG/DL (ref 0.76–1.27)
CRP SERPL-MCNC: <0.3 MG/DL (ref 0–0.5)
EOSINOPHIL # BLD AUTO: 0.16 10*3/MM3 (ref 0–0.4)
EOSINOPHIL NFR BLD AUTO: 2.3 % (ref 0.3–6.2)
ERYTHROCYTE [DISTWIDTH] IN BLOOD BY AUTOMATED COUNT: 12.1 % (ref 12.3–15.4)
ERYTHROCYTE [SEDIMENTATION RATE] IN BLOOD BY WESTERGREN METHOD: 1 MM/HR (ref 0–15)
GLOBULIN SER CALC-MCNC: 1.9 GM/DL
GLUCOSE SERPL-MCNC: 93 MG/DL (ref 65–99)
HCT VFR BLD AUTO: 46.5 % (ref 37.5–51)
HGB BLD-MCNC: 15.6 G/DL (ref 13–17.7)
IMM GRANULOCYTES # BLD AUTO: 0.01 10*3/MM3 (ref 0–0.05)
IMM GRANULOCYTES NFR BLD AUTO: 0.1 % (ref 0–0.5)
LYMPHOCYTES # BLD AUTO: 2.15 10*3/MM3 (ref 0.7–3.1)
LYMPHOCYTES NFR BLD AUTO: 30.4 % (ref 19.6–45.3)
MCH RBC QN AUTO: 30.7 PG (ref 26.6–33)
MCHC RBC AUTO-ENTMCNC: 33.5 G/DL (ref 31.5–35.7)
MCV RBC AUTO: 91.5 FL (ref 79–97)
MONOCYTES # BLD AUTO: 0.4 10*3/MM3 (ref 0.1–0.9)
MONOCYTES NFR BLD AUTO: 5.6 % (ref 5–12)
NEUTROPHILS # BLD AUTO: 4.3 10*3/MM3 (ref 1.7–7)
NEUTROPHILS NFR BLD AUTO: 60.8 % (ref 42.7–76)
NRBC BLD AUTO-RTO: 0 /100 WBC (ref 0–0.2)
PLATELET # BLD AUTO: 257 10*3/MM3 (ref 140–450)
POTASSIUM SERPL-SCNC: 4.6 MMOL/L (ref 3.5–5.2)
PROT SERPL-MCNC: 6.9 G/DL (ref 6–8.5)
RBC # BLD AUTO: 5.08 10*6/MM3 (ref 4.14–5.8)
SODIUM SERPL-SCNC: 145 MMOL/L (ref 136–145)
TTG IGA SER-ACNC: <2 U/ML (ref 0–3)
WBC # BLD AUTO: 7.08 10*3/MM3 (ref 3.4–10.8)

## 2021-09-07 DIAGNOSIS — R10.9 ABDOMINAL CRAMPS: Primary | ICD-10-CM

## 2021-09-07 DIAGNOSIS — K59.00 CONSTIPATION, UNSPECIFIED CONSTIPATION TYPE: ICD-10-CM

## 2021-09-16 ENCOUNTER — OFFICE VISIT (OUTPATIENT)
Dept: GASTROENTEROLOGY | Facility: CLINIC | Age: 31
End: 2021-09-16

## 2021-09-16 ENCOUNTER — TELEPHONE (OUTPATIENT)
Dept: GASTROENTEROLOGY | Facility: CLINIC | Age: 31
End: 2021-09-16

## 2021-09-16 ENCOUNTER — TRANSCRIBE ORDERS (OUTPATIENT)
Dept: ADMINISTRATIVE | Facility: HOSPITAL | Age: 31
End: 2021-09-16

## 2021-09-16 VITALS
BODY MASS INDEX: 19.9 KG/M2 | TEMPERATURE: 96.4 F | HEIGHT: 70 IN | SYSTOLIC BLOOD PRESSURE: 124 MMHG | DIASTOLIC BLOOD PRESSURE: 82 MMHG | WEIGHT: 139 LBS

## 2021-09-16 DIAGNOSIS — R10.13 DYSPEPSIA: ICD-10-CM

## 2021-09-16 DIAGNOSIS — R14.0 BLOATING: ICD-10-CM

## 2021-09-16 DIAGNOSIS — K62.5 RECTAL BLEEDING: ICD-10-CM

## 2021-09-16 DIAGNOSIS — R63.4 WEIGHT LOSS: ICD-10-CM

## 2021-09-16 DIAGNOSIS — Z01.818 OTHER SPECIFIED PRE-OPERATIVE EXAMINATION: Primary | ICD-10-CM

## 2021-09-16 DIAGNOSIS — R10.84 GENERALIZED ABDOMINAL PAIN: Primary | ICD-10-CM

## 2021-09-16 PROCEDURE — 99204 OFFICE O/P NEW MOD 45 MIN: CPT | Performed by: INTERNAL MEDICINE

## 2021-09-16 NOTE — PROGRESS NOTES
Chief Complaint   Patient presents with   • Constipation   • Abdominal Pain   • Bloated       History of Present Illness:   31 y.o. male c/o bloating intermittently after eating apples. No consitpation. NO diarrhea. Rare bright red blood on tissue (from hemorrhoids?). No melena. Rare mid abdominal cramps. He doesn't know what makes this worse or better. Is mostly a vegetarian. No nausea or vomiting. No fevers, hcills. Weight stable but lost weight last year.. Exercises. Smokes < 1 pack/week. ETOH - on weekends only. . Bun in oven. 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 + heartburn occaisonally. No dysphagia. Dad had a colon polyp. No FH of IBD.     History reviewed. No pertinent past medical history.    History reviewed. No pertinent surgical history.    No current outpatient medications on file.    No Known Allergies    Family History   Problem Relation Age of Onset   • Arthritis Mother    • Hyperlipidemia Father        Social History     Socioeconomic History   • Marital status: Unknown     Spouse name: Not on file   • Number of children: Not on file   • Years of education: Not on file   • Highest education level: Not on file   Tobacco Use   • Smoking status: Current Every Day Smoker     Packs/day: 0.25     Types: Cigarettes   • Smokeless tobacco: Never Used   Vaping Use   • Vaping Use: Never used   Substance and Sexual Activity   • Alcohol use: Yes     Alcohol/week: 7.0 standard drinks     Types: 7 Cans of beer per week   • Drug use: No   • Sexual activity: Defer       Review of Systems   Gastrointestinal: Positive for abdominal distention.   All other systems reviewed and are negative.    Pertinent positives and negatives documented in the HPI and all other systems reviewed and were found to be negative.  Vitals:    09/16/21 0838   BP: 124/82   Temp: 96.4 °F (35.8 °C)       Physical Exam  Vitals reviewed.   Constitutional:       General: He is not in acute distress.     Appearance: Normal  appearance. He is well-developed. He is not diaphoretic.   HENT:      Head: Normocephalic and atraumatic. Hair is normal.      Right Ear: Hearing, tympanic membrane, ear canal and external ear normal.      Left Ear: Hearing, tympanic membrane, ear canal and external ear normal.      Nose: Nose normal. No nasal deformity.      Mouth/Throat:      Mouth: Mucous membranes are moist. No oral lesions.      Pharynx: Uvula midline. No uvula swelling.   Eyes:      General: Lids are normal. No scleral icterus.        Right eye: No discharge.         Left eye: No discharge.      Extraocular Movements: Extraocular movements intact.      Right eye: Normal extraocular motion and no nystagmus.      Left eye: Normal extraocular motion and no nystagmus.      Conjunctiva/sclera: Conjunctivae normal.      Pupils: Pupils are equal, round, and reactive to light.   Neck:      Thyroid: No thyromegaly.      Vascular: No JVD.   Cardiovascular:      Rate and Rhythm: Normal rate and regular rhythm.      Pulses: Normal pulses.      Heart sounds: Normal heart sounds. No murmur heard.   No gallop.    Pulmonary:      Effort: Pulmonary effort is normal. No respiratory distress.      Breath sounds: Normal breath sounds. No wheezing or rales.   Chest:      Chest wall: No tenderness.   Abdominal:      General: Bowel sounds are normal. There is no distension.      Palpations: Abdomen is soft. There is no mass.      Tenderness: There is no abdominal tenderness. There is no guarding.      Hernia: No hernia is present.   Genitourinary:     Rectum: Normal. Guaiac result negative.   Musculoskeletal:         General: No tenderness or deformity. Normal range of motion.      Cervical back: Normal range of motion and neck supple.   Lymphadenopathy:      Cervical: No cervical adenopathy.   Skin:     General: Skin is warm and dry.      Findings: No rash.   Neurological:      Mental Status: He is alert and oriented to person, place, and time.      Cranial Nerves:  No cranial nerve deficit.      Motor: No abnormal muscle tone.      Coordination: Coordination normal.      Deep Tendon Reflexes: Reflexes are normal and symmetric. Reflexes normal.   Psychiatric:         Mood and Affect: Mood normal.         Behavior: Behavior normal.         Thought Content: Thought content normal.         Judgment: Judgment normal.         Diagnoses and all orders for this visit:    1. Generalized abdominal pain (Primary)  -     CT Abdomen Pelvis With Contrast; Future  -     Case Request; Standing  -     Case Request    2. Weight loss  -     CT Abdomen Pelvis With Contrast; Future  -     Case Request; Standing  -     Case Request    3. Bloating  -     CT Abdomen Pelvis With Contrast; Future  -     Case Request; Standing  -     Case Request    4. Rectal bleeding  -     Case Request; Standing  -     Case Request    5. Dyspepsia  -     Case Request; Standing  -     Case Request    Other orders  -     Follow Anesthesia Guidelines / Standing Orders; Future  -     Obtain Informed Consent; Future  -     Implement Anesthesia orders day of procedure.; Standing  -     Obtain informed consent; Standing  -     Verify bowel prep was successful; Standing  -     Give tap water enema if bowel prep was insufficient; Standing      Assessment:  1. Bloating  2. Abdominal cramps and pain.  3. Rare rectal bleeding  4. Weight loss last year.   5.  (dad) colon polyp    Recommendations:  1. Stop smoking  2. CT abd/pelvis  3. Colonoscopy + EGD    No follow-ups on file.    Marcelo Coker MD  9/16/2021

## 2021-09-16 NOTE — TELEPHONE ENCOUNTER
Spoke with patient in person to schedule EGD/CS. Scheduled at [facility] on 09/21/21 with arrival time of 9:00am. Spoke with Carina--Sandoval

## 2021-09-18 ENCOUNTER — LAB (OUTPATIENT)
Dept: LAB | Facility: HOSPITAL | Age: 31
End: 2021-09-18

## 2021-09-18 DIAGNOSIS — Z01.818 OTHER SPECIFIED PRE-OPERATIVE EXAMINATION: ICD-10-CM

## 2021-09-18 LAB — SARS-COV-2 ORF1AB RESP QL NAA+PROBE: NOT DETECTED

## 2021-09-18 PROCEDURE — U0004 COV-19 TEST NON-CDC HGH THRU: HCPCS

## 2021-09-18 PROCEDURE — C9803 HOPD COVID-19 SPEC COLLECT: HCPCS

## 2021-09-21 ENCOUNTER — ANESTHESIA (OUTPATIENT)
Dept: GASTROENTEROLOGY | Facility: HOSPITAL | Age: 31
End: 2021-09-21

## 2021-09-21 ENCOUNTER — HOSPITAL ENCOUNTER (OUTPATIENT)
Facility: HOSPITAL | Age: 31
Setting detail: HOSPITAL OUTPATIENT SURGERY
Discharge: HOME | End: 2021-09-21
Attending: INTERNAL MEDICINE | Admitting: INTERNAL MEDICINE

## 2021-09-21 ENCOUNTER — ANESTHESIA EVENT (OUTPATIENT)
Dept: GASTROENTEROLOGY | Facility: HOSPITAL | Age: 31
End: 2021-09-21

## 2021-09-21 VITALS
HEART RATE: 89 BPM | DIASTOLIC BLOOD PRESSURE: 79 MMHG | OXYGEN SATURATION: 98 % | HEIGHT: 70 IN | BODY MASS INDEX: 19.04 KG/M2 | RESPIRATION RATE: 16 BRPM | SYSTOLIC BLOOD PRESSURE: 118 MMHG | WEIGHT: 133 LBS

## 2021-09-21 DIAGNOSIS — R14.0 BLOATING: ICD-10-CM

## 2021-09-21 DIAGNOSIS — K62.5 RECTAL BLEEDING: ICD-10-CM

## 2021-09-21 DIAGNOSIS — R10.84 GENERALIZED ABDOMINAL PAIN: ICD-10-CM

## 2021-09-21 DIAGNOSIS — R10.13 DYSPEPSIA: ICD-10-CM

## 2021-09-21 DIAGNOSIS — R63.4 WEIGHT LOSS: ICD-10-CM

## 2021-09-21 PROCEDURE — 25010000002 PROPOFOL 10 MG/ML EMULSION: Performed by: ANESTHESIOLOGY

## 2021-09-21 PROCEDURE — 45378 DIAGNOSTIC COLONOSCOPY: CPT | Performed by: INTERNAL MEDICINE

## 2021-09-21 PROCEDURE — 88305 TISSUE EXAM BY PATHOLOGIST: CPT | Performed by: INTERNAL MEDICINE

## 2021-09-21 PROCEDURE — 43239 EGD BIOPSY SINGLE/MULTIPLE: CPT | Performed by: INTERNAL MEDICINE

## 2021-09-21 RX ORDER — SODIUM CHLORIDE 0.9 % (FLUSH) 0.9 %
10 SYRINGE (ML) INJECTION AS NEEDED
Status: DISCONTINUED | OUTPATIENT
Start: 2021-09-21 | End: 2021-09-21 | Stop reason: HOSPADM

## 2021-09-21 RX ORDER — SODIUM CHLORIDE, SODIUM LACTATE, POTASSIUM CHLORIDE, CALCIUM CHLORIDE 600; 310; 30; 20 MG/100ML; MG/100ML; MG/100ML; MG/100ML
1000 INJECTION, SOLUTION INTRAVENOUS CONTINUOUS
Status: DISCONTINUED | OUTPATIENT
Start: 2021-09-21 | End: 2021-09-21 | Stop reason: HOSPADM

## 2021-09-21 RX ORDER — LIDOCAINE HYDROCHLORIDE 20 MG/ML
INJECTION, SOLUTION INFILTRATION; PERINEURAL AS NEEDED
Status: DISCONTINUED | OUTPATIENT
Start: 2021-09-21 | End: 2021-09-21 | Stop reason: SURG

## 2021-09-21 RX ORDER — PROPOFOL 10 MG/ML
VIAL (ML) INTRAVENOUS CONTINUOUS PRN
Status: DISCONTINUED | OUTPATIENT
Start: 2021-09-21 | End: 2021-09-21 | Stop reason: SURG

## 2021-09-21 RX ORDER — PROPOFOL 10 MG/ML
VIAL (ML) INTRAVENOUS AS NEEDED
Status: DISCONTINUED | OUTPATIENT
Start: 2021-09-21 | End: 2021-09-21 | Stop reason: SURG

## 2021-09-21 RX ADMIN — PROPOFOL 100 MG: 10 INJECTION, EMULSION INTRAVENOUS at 09:03

## 2021-09-21 RX ADMIN — SODIUM CHLORIDE, POTASSIUM CHLORIDE, SODIUM LACTATE AND CALCIUM CHLORIDE 1000 ML: 600; 310; 30; 20 INJECTION, SOLUTION INTRAVENOUS at 08:52

## 2021-09-21 RX ADMIN — LIDOCAINE HYDROCHLORIDE 50 MG: 20 INJECTION, SOLUTION INFILTRATION; PERINEURAL at 08:58

## 2021-09-21 RX ADMIN — Medication 150 MCG/KG/MIN: at 08:59

## 2021-09-21 RX ADMIN — PROPOFOL 100 MG: 10 INJECTION, EMULSION INTRAVENOUS at 08:59

## 2021-09-21 NOTE — H&P
Chief Complaint   Patient presents with   • Constipation   • Abdominal Pain   • Bloated         History of Present Illness:   31 y.o. male c/o bloating intermittently after eating apples. No consitpation. NO diarrhea. Rare bright red blood on tissue (from hemorrhoids?). No melena. Rare mid abdominal cramps. He doesn't know what makes this worse or better. Is mostly a vegetarian. No nausea or vomiting. No fevers, hcills. Weight stable but lost weight last year.. Exercises. Smokes < 1 pack/week. ETOH - on weekends only. . Bun in oven. 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 + heartburn occaisonally. No dysphagia. Dad had a colon polyp. No FH of IBD.      Medical History   History reviewed. No pertinent past medical history.        Surgical History   History reviewed. No pertinent surgical history.        No current outpatient medications on file.     No Known Allergies           Family History   Problem Relation Age of Onset   • Arthritis Mother     • Hyperlipidemia Father           Social History   Social History            Socioeconomic History   • Marital status: Unknown       Spouse name: Not on file   • Number of children: Not on file   • Years of education: Not on file   • Highest education level: Not on file   Tobacco Use   • Smoking status: Current Every Day Smoker       Packs/day: 0.25       Types: Cigarettes   • Smokeless tobacco: Never Used   Vaping Use   • Vaping Use: Never used   Substance and Sexual Activity   • Alcohol use: Yes       Alcohol/week: 7.0 standard drinks       Types: 7 Cans of beer per week   • Drug use: No   • Sexual activity: Defer            Review of Systems   Gastrointestinal: Positive for abdominal distention.   All other systems reviewed and are negative.     Pertinent positives and negatives documented in the HPI and all other systems reviewed and were found to be negative.      Vitals:     09/16/21 0838   BP: 124/82   Temp: 96.4 °F (35.8 °C)         Physical  Exam  Vitals reviewed.   Constitutional:       General: He is not in acute distress.     Appearance: Normal appearance. He is well-developed. He is not diaphoretic.   HENT:      Head: Normocephalic and atraumatic. Hair is normal.      Right Ear: Hearing, tympanic membrane, ear canal and external ear normal.      Left Ear: Hearing, tympanic membrane, ear canal and external ear normal.      Nose: Nose normal. No nasal deformity.      Mouth/Throat:      Mouth: Mucous membranes are moist. No oral lesions.      Pharynx: Uvula midline. No uvula swelling.   Eyes:      General: Lids are normal. No scleral icterus.        Right eye: No discharge.         Left eye: No discharge.      Extraocular Movements: Extraocular movements intact.      Right eye: Normal extraocular motion and no nystagmus.      Left eye: Normal extraocular motion and no nystagmus.      Conjunctiva/sclera: Conjunctivae normal.      Pupils: Pupils are equal, round, and reactive to light.   Neck:      Thyroid: No thyromegaly.      Vascular: No JVD.   Cardiovascular:      Rate and Rhythm: Normal rate and regular rhythm.      Pulses: Normal pulses.      Heart sounds: Normal heart sounds. No murmur heard.   No gallop.    Pulmonary:      Effort: Pulmonary effort is normal. No respiratory distress.      Breath sounds: Normal breath sounds. No wheezing or rales.   Chest:      Chest wall: No tenderness.   Abdominal:      General: Bowel sounds are normal. There is no distension.      Palpations: Abdomen is soft. There is no mass.      Tenderness: There is no abdominal tenderness. There is no guarding.      Hernia: No hernia is present.   Genitourinary:     Rectum: Normal. Guaiac result negative.   Musculoskeletal:         General: No tenderness or deformity. Normal range of motion.      Cervical back: Normal range of motion and neck supple.   Lymphadenopathy:      Cervical: No cervical adenopathy.   Skin:     General: Skin is warm and dry.      Findings: No rash.    Neurological:      Mental Status: He is alert and oriented to person, place, and time.      Cranial Nerves: No cranial nerve deficit.      Motor: No abnormal muscle tone.      Coordination: Coordination normal.      Deep Tendon Reflexes: Reflexes are normal and symmetric. Reflexes normal.   Psychiatric:         Mood and Affect: Mood normal.         Behavior: Behavior normal.         Thought Content: Thought content normal.         Judgment: Judgment normal.            Diagnoses and all orders for this visit:     1. Generalized abdominal pain (Primary)  -     CT Abdomen Pelvis With Contrast; Future  -     Case Request; Standing  -     Case Request     2. Weight loss  -     CT Abdomen Pelvis With Contrast; Future  -     Case Request; Standing  -     Case Request     3. Bloating  -     CT Abdomen Pelvis With Contrast; Future  -     Case Request; Standing  -     Case Request     4. Rectal bleeding  -     Case Request; Standing  -     Case Request     5. Dyspepsia  -     Case Request; Standing  -     Case Request     Other orders  -     Follow Anesthesia Guidelines / Standing Orders; Future  -     Obtain Informed Consent; Future  -     Implement Anesthesia orders day of procedure.; Standing  -     Obtain informed consent; Standing  -     Verify bowel prep was successful; Standing  -     Give tap water enema if bowel prep was insufficient; Standing        Assessment:  1. Bloating  2. Abdominal cramps and pain.  3. Rare rectal bleeding  4. Weight loss last year.   5.  (dad) colon polyp     Recommendations:  1. Stop smoking  2. CT abd/pelvis  3. Colonoscopy + EGD     No follow-ups on file.     9/21/21 - No change from the above H and P.   Marcelo Coker MD

## 2021-09-21 NOTE — ANESTHESIA PREPROCEDURE EVALUATION
Anesthesia Evaluation     Patient summary reviewed   NPO Solid Status: > 8 hours             Airway   No difficulty expected  Dental      Pulmonary    Cardiovascular     Rhythm: regular        Neuro/Psych  GI/Hepatic/Renal/Endo    (+)  GI bleeding ,     Musculoskeletal     Abdominal    Substance History      OB/GYN          Other                        Anesthesia Plan    ASA 2     MAC       Anesthetic plan, all risks, benefits, and alternatives have been provided, discussed and informed consent has been obtained with: patient.

## 2021-09-21 NOTE — DISCHARGE INSTRUCTIONS
For the next 24 hours patient needs to be with a responsible adult.    For 24 hours DO NOT drive, operate machinery, appliances, drink alcohol, make important decisions or sign legal documents.    Start with a light or bland diet and advance to regular diet as tolerated.    Follow recommendations on procedure report provided by your doctor.    Call Dr Coker for problems [phone]    Problems may include but not limited to: large amounts of bleeding, trouble breathing, repeated vomiting, severe unrelieved pain, fever or chills.

## 2021-09-21 NOTE — ANESTHESIA POSTPROCEDURE EVALUATION
Patient: Elijah Fisher    Procedure Summary     Date: 09/21/21 [facility]    Anesthesia Start: 0855 Anesthesia Stop: 0934    Procedures:       ESOPHAGOGASTRODUODENOSCOPY WITH COLD BIOPSIES (N/A Esophagus)      COLONOSCOPY TO CECUM AND TERMINAL ILEUM (N/A ) Diagnosis:       Generalized abdominal pain      Weight loss      Bloating      Rectal bleeding      Dyspepsia      (Generalized abdominal pain [R10.84])      (Weight loss [R63.4])      (Bloating [R14.0])      (Rectal bleeding [K62.5])      (Dyspepsia [R10.13])    Surgeons: Marcelo Coker MD Provider: Garth Bedolla MD    Anesthesia Type: MAC ASA Status: 2          Anesthesia Type: MAC    Vitals  Vitals Value Taken Time   /79 09/21/21 0956   Temp     Pulse 89 09/21/21 0956   Resp 16 09/21/21 0956   SpO2 98 % 09/21/21 0956           Post Anesthesia Care and Evaluation    Patient location during evaluation: PACU  Patient participation: complete - patient participated  Level of consciousness: awake  Pain score: 1  Pain management: adequate  Airway patency: patent  Anesthetic complications: No anesthetic complications  PONV Status: none  Cardiovascular status: acceptable  Respiratory status: acceptable  Hydration status: acceptable

## 2021-09-22 LAB
CYTO UR: NORMAL
LAB AP CASE REPORT: NORMAL
PATH REPORT.FINAL DX SPEC: NORMAL
PATH REPORT.GROSS SPEC: NORMAL

## 2021-09-26 NOTE — PROGRESS NOTES
09/25/21       Tell him that path from the EGD looked good. NO evidence of helicobacter pylori.       We will see what the CT abd/pelvis shows?       Send to his PCP.   [facility]

## 2021-10-05 ENCOUNTER — TELEPHONE (OUTPATIENT)
Dept: GASTROENTEROLOGY | Facility: CLINIC | Age: 31
End: 2021-10-05

## 2021-10-05 NOTE — TELEPHONE ENCOUNTER
Called pt and advised of Dr Coker's note. Verb understanding.     [facility]

## 2021-10-05 NOTE — TELEPHONE ENCOUNTER
----- Message from Marcelo Coker MD sent at 9/25/2021  8:35 PM EDT -----  09/25/21       Tell him that path from the EGD looked good. NO evidence of helicobacter pylori.       We will see what the CT abd/pelvis shows?       Send to his PCP.   [facility]

## 2021-10-15 ENCOUNTER — HOSPITAL ENCOUNTER (OUTPATIENT)
Dept: CT IMAGING | Facility: HOSPITAL | Age: 31
Discharge: HOME | End: 2021-10-15
Admitting: INTERNAL MEDICINE

## 2021-10-15 DIAGNOSIS — R14.0 BLOATING: ICD-10-CM

## 2021-10-15 DIAGNOSIS — R63.4 WEIGHT LOSS: ICD-10-CM

## 2021-10-15 DIAGNOSIS — R10.84 GENERALIZED ABDOMINAL PAIN: ICD-10-CM

## 2021-10-15 PROCEDURE — 74177 CT ABD & PELVIS W/CONTRAST: CPT

## 2021-10-15 PROCEDURE — 25010000002 IOPAMIDOL 61 % SOLUTION: Performed by: INTERNAL MEDICINE

## 2021-10-15 RX ADMIN — IOPAMIDOL 100 ML: 612 INJECTION, SOLUTION INTRAVENOUS at 11:08

## 2021-10-16 NOTE — PROGRESS NOTES
10/16/21       Tell him that the CT of the abdomen and pelvis came back unrevealing, which is good.  Please fax a copy of this report to his PCP.  Make sure the patient is going to follow-up with me in the office in 8 to 12 weeks?  Rafita. jenna

## 2021-10-18 ENCOUNTER — TELEPHONE (OUTPATIENT)
Dept: GASTROENTEROLOGY | Facility: CLINIC | Age: 31
End: 2021-10-18

## 2021-10-18 NOTE — TELEPHONE ENCOUNTER
Call to pt.  Advise per Dr Coker note.  Verb understanding.     Update to DR Jean-Pierre Garcia.      Pt will call back to make appt with DR Coker.

## 2021-10-18 NOTE — TELEPHONE ENCOUNTER
----- Message from Marcelo Coker MD sent at 10/16/2021 12:58 PM EDT -----  10/16/21       Tell him that the CT of the abdomen and pelvis came back unrevealing, which is good.  Please fax a copy of this report to his PCP.  Make sure the patient is going to follow-up with me in the office in 8 to 12 weeks?  Thx. kjh

## 2021-11-05 ENCOUNTER — OFFICE VISIT (OUTPATIENT)
Dept: FAMILY MEDICINE CLINIC | Facility: CLINIC | Age: 31
End: 2021-11-05

## 2021-11-05 VITALS
SYSTOLIC BLOOD PRESSURE: 147 MMHG | OXYGEN SATURATION: 99 % | HEIGHT: 70 IN | WEIGHT: 142.1 LBS | DIASTOLIC BLOOD PRESSURE: 82 MMHG | TEMPERATURE: 97.5 F | BODY MASS INDEX: 20.34 KG/M2 | HEART RATE: 68 BPM

## 2021-11-05 DIAGNOSIS — S20.461A INSECT BITE OF RIGHT BACK WALL OF THORAX, INITIAL ENCOUNTER: Primary | ICD-10-CM
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
## 2021-11-05 PROCEDURE — 99213 OFFICE O/P EST LOW 20 MIN: CPT | Performed by: FAMILY MEDICINE

## 2021-11-05 RX ORDER — OMEPRAZOLE 40 MG/1
CAPSULE, DELAYED RELEASE ORAL
COMMUNITY
End: 2022-08-31

## 2021-11-05 NOTE — PROGRESS NOTES
"Chief Complaint  Cyst (x 2 wks- under right arm )    Subjective          Elijah Fisher presents to [facility]  History of Present Illness     Small pea-sized cyst noted about 2 weeks ago right thorax.  Currently axilla.  Not really bothering him.  He states is gotten smaller.  He had an insect bite on his right back, about 6 inches inferior to this.  That is gotten better.  Otherwise feels well.  No fever.  Some anxiety.  His GI issues are resolved since his negative work-up with the gastroenterologist.    Objective   Vital Signs:   /82   Pulse 68   Temp 97.5 °F (36.4 °C) (Temporal)   Ht 177.8 cm (70\")   Wt 64.5 kg (142 lb 1.6 oz)   SpO2 99%   BMI 20.39 kg/m²     Physical Exam  Constitutional:       Appearance: Normal appearance.   Skin:     Comments: There is a minimally palpable 3 or 4 mm subdermal nodule.  Freely movable.  Nontender.  No skin changes.  No other surrounding adenopathy.  No axillary nodes.  No head or neck adenopathy.  The right mid neck reveals a small erythematous papule, appears to be a resolving insect bite.   Neurological:      Mental Status: He is alert.        Result Review :   The following data was reviewed by: Jean-Pierre Garcia MD on 11/05/2021:              Assessment and Plan    Diagnoses and all orders for this visit:    1. Insect bite of right back wall of thorax, initial encounter (Primary)      Resolving uncomplicated insect bite with some reactive lymphadenopathy, also resolving.  No other thorax head neck or axilla adenopathy.  Observation order.  If the symptoms return he will let us know.       Follow Up   No follow-ups on file.  Patient was given instructions and counseling regarding his condition or for health maintenance advice. Please see specific information pulled into the AVS if appropriate.       "

## 2021-12-10 ENCOUNTER — TELEPHONE (OUTPATIENT)
Dept: GASTROENTEROLOGY | Facility: CLINIC | Age: 31
End: 2021-12-10

## 2021-12-10 NOTE — TELEPHONE ENCOUNTER
I don't have any thoughts off the top of my head. Have him come see me in the office and we can discuss what to do next. Thx.kjh

## 2021-12-10 NOTE — TELEPHONE ENCOUNTER
----- Message from Elijah Fisher sent at 12/9/2021  8:28 PM EST -----  Regarding: Bloating returned  Hi Dr. Coker,  Not long after my CAT scan in October, I switched my daily probiotic, and my bloating symptoms dissipated over several days.  On Monday of this week the symptoms started to return.  After meals I’m now feeling very bloated and uncomfortable. What’s your recommendation for moving forward?  Thanks,  Elijah

## 2021-12-13 NOTE — TELEPHONE ENCOUNTER
Called pt and advised of Dr Coker's note. Verb understanding.  Pt states he is driving and will call back to make appt.

## 2022-01-03 ENCOUNTER — OFFICE VISIT (OUTPATIENT)
Dept: GASTROENTEROLOGY | Facility: CLINIC | Age: 32
End: 2022-01-03

## 2022-01-03 VITALS
OXYGEN SATURATION: 98 % | WEIGHT: 146.2 LBS | HEIGHT: 70 IN | DIASTOLIC BLOOD PRESSURE: 80 MMHG | BODY MASS INDEX: 20.93 KG/M2 | TEMPERATURE: 97.6 F | SYSTOLIC BLOOD PRESSURE: 149 MMHG

## 2022-01-03 DIAGNOSIS — K26.9 DUODENAL ULCER: ICD-10-CM

## 2022-01-03 DIAGNOSIS — R14.0 BLOATING: Primary | ICD-10-CM

## 2022-01-03 PROCEDURE — 99214 OFFICE O/P EST MOD 30 MIN: CPT | Performed by: INTERNAL MEDICINE

## 2022-01-03 NOTE — PROGRESS NOTES
Chief Complaint   Patient presents with   • Bloated       History of Present Illness:   31 y.o. male        He had an EGD and colonoscopy in 9 of 2021.  The EGD showed:  Assessment:  - No gross lesions in esophagus.  - Erythematous mucosa in the stomach. Biopsied.  - One non-bleeding duodenal ulcer with no stigmata of bleeding. Biopsied.       The colonoscopy showed:  Assessment:  - The examined portion of the ileum was normal.  - Internal hemorrhoids.  - The examination was otherwise normal.  - No specimens collected.       He had a CAT scan of the abdomen and pelvis in 10 of 2021 that was unrevealing.       I saw him first in 9 of 2021.  My assessment and plan at that time was as follows:  Assessment:  1. Bloating  2. Abdominal cramps and pain.  3. Rare rectal bleeding  4. Weight loss last year.   5. FH (dad) colon polyp     Recommendations:  1. Stop smoking  2. CT abd/pelvis  3. Colonoscopy + EGD    He still has bloating. He eats mostly plant based diet with his wife. When he stopped the Probiotic the bloating went away. Still on the Omeprazole 40 mg/day and his heartburn is gone. Was prior taking ibuprofen. ETOH - 6 pack on weekends. Weight stable.     Past Medical History:   Diagnosis Date   • Abdominal bloating        Past Surgical History:   Procedure Laterality Date   • COLONOSCOPY N/A 9/21/2021    Procedure: COLONOSCOPY TO CECUM AND TERMINAL ILEUM;  Surgeon: Marcelo Coker MD;  Location: [facility] ENDOSCOPY;  Service: Gastroenterology;  Laterality: N/A;  PRE:RECTAL BLEEDING, FAMILY HX OF POLYPS  POST:INTERNAL HEMORRHOIDS   • ENDOSCOPY N/A 9/21/2021    Procedure: ESOPHAGOGASTRODUODENOSCOPY WITH COLD BIOPSIES;  Surgeon: Marcelo Coker MD;  Location: [facility] ENDOSCOPY;  Service: Gastroenterology;  Laterality: N/A;  PRE:ABDOMINAL DISCOMFORT, BLOATING  POST: GASTRITIS, DUODENAL BULB ULCER   • MOUTH SURGERY           Current Outpatient Medications:   •  omeprazole (priLOSEC) 40 MG capsule, omeprazole 40 mg  capsule,delayed release, Disp: , Rfl:     Allergies   Allergen Reactions   • Amoxicillin Other (See Comments)     Childhood reaction       Family History   Problem Relation Age of Onset   • Arthritis Mother    • Hyperlipidemia Father    • Colon polyps Father    • Malig Hyperthermia Neg Hx        Social History     Socioeconomic History   • Marital status:    Tobacco Use   • Smoking status: Current Every Day Smoker     Packs/day: 0.25     Types: Cigarettes   • Smokeless tobacco: Never Used   • Tobacco comment: weekends   Vaping Use   • Vaping Use: Never used   Substance and Sexual Activity   • Alcohol use: Yes     Alcohol/week: 7.0 standard drinks     Types: 7 Cans of beer per week     Comment: weekends   • Drug use: No   • Sexual activity: Yes     Partners: Female       Review of Systems   Gastrointestinal: Negative for abdominal pain.   All other systems reviewed and are negative.    Pertinent positives and negatives documented in the HPI and all other systems reviewed and were found to be negative.  Vitals:    01/03/22 0928   BP: 149/80   Temp: 97.6 °F (36.4 °C)   SpO2: 98%       Physical Exam  Vitals reviewed.   Constitutional:       General: He is not in acute distress.     Appearance: Normal appearance. He is well-developed. He is not diaphoretic.   HENT:      Head: Normocephalic and atraumatic. Hair is normal.      Right Ear: Hearing, tympanic membrane, ear canal and external ear normal.      Left Ear: Hearing, tympanic membrane, ear canal and external ear normal.      Nose: Nose normal. No nasal deformity.      Mouth/Throat:      Mouth: Mucous membranes are moist. No oral lesions.      Pharynx: Uvula midline. No uvula swelling.   Eyes:      General: Lids are normal. No scleral icterus.        Right eye: No discharge.         Left eye: No discharge.      Extraocular Movements: Extraocular movements intact.      Right eye: Normal extraocular motion and no nystagmus.      Left eye: Normal extraocular  motion and no nystagmus.      Conjunctiva/sclera: Conjunctivae normal.      Pupils: Pupils are equal, round, and reactive to light.   Neck:      Thyroid: No thyromegaly.      Vascular: No JVD.   Cardiovascular:      Rate and Rhythm: Normal rate and regular rhythm.      Pulses: Normal pulses.      Heart sounds: Normal heart sounds. No murmur heard.  No gallop.    Pulmonary:      Effort: Pulmonary effort is normal. No respiratory distress.      Breath sounds: Normal breath sounds. No wheezing or rales.   Chest:      Chest wall: No tenderness.   Abdominal:      General: Bowel sounds are normal. There is no distension.      Palpations: Abdomen is soft. There is no mass.      Tenderness: There is no abdominal tenderness. There is no guarding.      Hernia: No hernia is present.   Musculoskeletal:         General: No tenderness or deformity. Normal range of motion.      Cervical back: Normal range of motion and neck supple.   Lymphadenopathy:      Cervical: No cervical adenopathy.   Skin:     General: Skin is warm and dry.      Findings: No rash.   Neurological:      Mental Status: He is alert and oriented to person, place, and time.      Cranial Nerves: No cranial nerve deficit.      Motor: No abnormal muscle tone.      Coordination: Coordination normal.      Deep Tendon Reflexes: Reflexes are normal and symmetric. Reflexes normal.   Psychiatric:         Mood and Affect: Mood normal.         Behavior: Behavior normal.         Thought Content: Thought content normal.         Judgment: Judgment normal.         Diagnoses and all orders for this visit:    1. Bloating (Primary)    2. Duodenal ulcer      Assessment:  1. H/o duodenal ulcer - NSAIDs induced.  2. Bloating that seems to be from Probiotics.  3.     Recommendations:  1. Stop the Ompeprazole   2. Stop the Probiotic  3. Trial of lactose free diet.   4. F/u 6 mos. We discussed possibly doing a sbft.   5. Avoid NSAIDs if possible.     Return in about 6 months (around  7/3/2022).    Marcelo Coker MD  1/3/2022

## 2022-04-04 DIAGNOSIS — Z00.00 ANNUAL PHYSICAL EXAM: ICD-10-CM

## 2022-04-04 DIAGNOSIS — Z00.00 ANNUAL PHYSICAL EXAM: Primary | ICD-10-CM

## 2022-04-05 LAB
ALBUMIN SERPL-MCNC: 4.9 G/DL (ref 4–5)
ALBUMIN/GLOB SERPL: 2.1 {RATIO} (ref 1.2–2.2)
ALP SERPL-CCNC: 71 IU/L (ref 44–121)
ALT SERPL-CCNC: 25 IU/L (ref 0–44)
AST SERPL-CCNC: 23 IU/L (ref 0–40)
BASOPHILS # BLD AUTO: 0.1 X10E3/UL (ref 0–0.2)
BASOPHILS NFR BLD AUTO: 1 %
BILIRUB SERPL-MCNC: 0.7 MG/DL (ref 0–1.2)
BUN SERPL-MCNC: 13 MG/DL (ref 6–20)
BUN/CREAT SERPL: 15 (ref 9–20)
CALCIUM SERPL-MCNC: 9.7 MG/DL (ref 8.7–10.2)
CHLORIDE SERPL-SCNC: 104 MMOL/L (ref 96–106)
CHOLEST SERPL-MCNC: 176 MG/DL (ref 100–199)
CO2 SERPL-SCNC: 22 MMOL/L (ref 20–29)
CREAT SERPL-MCNC: 0.86 MG/DL (ref 0.76–1.27)
EGFRCR SERPLBLD CKD-EPI 2021: 118 ML/MIN/1.73
EOSINOPHIL # BLD AUTO: 0.2 X10E3/UL (ref 0–0.4)
EOSINOPHIL NFR BLD AUTO: 2 %
ERYTHROCYTE [DISTWIDTH] IN BLOOD BY AUTOMATED COUNT: 12.1 % (ref 11.6–15.4)
GLOBULIN SER CALC-MCNC: 2.3 G/DL (ref 1.5–4.5)
GLUCOSE SERPL-MCNC: 95 MG/DL (ref 65–99)
HCT VFR BLD AUTO: 46.1 % (ref 37.5–51)
HDLC SERPL-MCNC: 66 MG/DL
HGB BLD-MCNC: 15.4 G/DL (ref 13–17.7)
IMM GRANULOCYTES # BLD AUTO: 0 X10E3/UL (ref 0–0.1)
IMM GRANULOCYTES NFR BLD AUTO: 0 %
LDLC SERPL CALC-MCNC: 93 MG/DL (ref 0–99)
LYMPHOCYTES # BLD AUTO: 2.6 X10E3/UL (ref 0.7–3.1)
LYMPHOCYTES NFR BLD AUTO: 28 %
MCH RBC QN AUTO: 30.4 PG (ref 26.6–33)
MCHC RBC AUTO-ENTMCNC: 33.4 G/DL (ref 31.5–35.7)
MCV RBC AUTO: 91 FL (ref 79–97)
MONOCYTES # BLD AUTO: 0.5 X10E3/UL (ref 0.1–0.9)
MONOCYTES NFR BLD AUTO: 6 %
NEUTROPHILS # BLD AUTO: 5.7 X10E3/UL (ref 1.4–7)
NEUTROPHILS NFR BLD AUTO: 63 %
PLATELET # BLD AUTO: 229 X10E3/UL (ref 150–450)
POTASSIUM SERPL-SCNC: 4.4 MMOL/L (ref 3.5–5.2)
PROT SERPL-MCNC: 7.2 G/DL (ref 6–8.5)
RBC # BLD AUTO: 5.07 X10E6/UL (ref 4.14–5.8)
SODIUM SERPL-SCNC: 140 MMOL/L (ref 134–144)
TRIGL SERPL-MCNC: 94 MG/DL (ref 0–149)
VLDLC SERPL CALC-MCNC: 17 MG/DL (ref 5–40)
WBC # BLD AUTO: 9.1 X10E3/UL (ref 3.4–10.8)

## 2022-04-26 ENCOUNTER — TELEPHONE (OUTPATIENT)
Dept: GASTROENTEROLOGY | Facility: CLINIC | Age: 32
End: 2022-04-26

## 2022-04-26 NOTE — TELEPHONE ENCOUNTER
----- Message from Elijah Fisher sent at 4/26/2022  3:44 PM EDT -----  Regarding: Ibuprofen usage  Hi Dr. Coker,  Following my colonoscopy, you instructed me to stop taking ibuprofen to allow a duodenal ulcer to heal.  My son started  several weeks ago, and he’s been bringing home viruses that have made my wife and me sick relatively consistently.  I also occasionally get migraines.  Ibuprofen has always been helpful for me in these situations.  Is it okay to take ibuprofen intermittently, or should I avoid it completely?  Thanks

## 2022-08-31 ENCOUNTER — OFFICE VISIT (OUTPATIENT)
Dept: FAMILY MEDICINE CLINIC | Facility: CLINIC | Age: 32
End: 2022-08-31

## 2022-08-31 VITALS
OXYGEN SATURATION: 99 % | TEMPERATURE: 97.5 F | WEIGHT: 138.5 LBS | HEIGHT: 70 IN | SYSTOLIC BLOOD PRESSURE: 148 MMHG | DIASTOLIC BLOOD PRESSURE: 82 MMHG | HEART RATE: 109 BPM | BODY MASS INDEX: 19.83 KG/M2

## 2022-08-31 DIAGNOSIS — L23.7 POISON IVY DERMATITIS: Primary | ICD-10-CM

## 2022-08-31 PROCEDURE — 99213 OFFICE O/P EST LOW 20 MIN: CPT | Performed by: FAMILY MEDICINE

## 2022-08-31 RX ORDER — BETAMETHASONE DIPROPIONATE 0.5 MG/G
1 CREAM TOPICAL 2 TIMES DAILY
Qty: 50 G | Refills: 0 | Status: SHIPPED | OUTPATIENT
Start: 2022-08-31

## 2022-08-31 NOTE — PROGRESS NOTES
"Chief Complaint  Mass (C/o pea size lump on R elbow since yesterday, pt also has poison ivy )    Subjective        Elijah Fisher presents to [facility]  History of Present Illness     Poison ivy rash.  He has had before.  Started about 5 days ago.  This is a area of swelling on the left inner elbow.  So achy.  He states 1 out of 10 discomfort.  He also has an area of poison ivy on his abdomen.  All feeling better.    Objective   Vital Signs:  /82   Pulse 109   Temp 97.5 °F (36.4 °C)   Ht 177.8 cm (70\")   Wt 62.8 kg (138 lb 8 oz)   SpO2 99%   BMI 19.87 kg/m²   Estimated body mass index is 19.87 kg/m² as calculated from the following:    Height as of this encounter: 177.8 cm (70\").    Weight as of this encounter: 62.8 kg (138 lb 8 oz).    BMI is within normal parameters. No other follow-up for BMI required.      Physical Exam  Constitutional:       Appearance: He is not toxic-appearing or diaphoretic.   Musculoskeletal:      Comments: On the bilateral forearms there is a vesicular rash with a linear distribution consistent with a contact dermatitis such as poison ivy.  On the right elbow there is a small palpable slightly tender lymph node along the lymphatic distribution of the rash at the medial elbow near the medial epicondyle.  He actually has a smaller 1 on the left elbow.   Skin:     Comments: Rashes above, plus a vesicular rash on the abdomen consistent with probable poison ivy also.        Result Review :                Assessment and Plan   Diagnoses and all orders for this visit:    1. Poison ivy dermatitis (Primary)    Other orders  -     betamethasone, augmented, (Diprolene AF) 0.05 % cream; Apply 1 application topically to the appropriate area as directed 2 (Two) Times a Day.  Dispense: 50 g; Refill: 0      Please have a dermatitis with reactive lymphadenopathy on the arms.  At this point is symptoms are already improving.  I do not see need for prednisone at this " time.  However I did prescribe Diprolene cream to use as needed.  If the reactive lymph nodes do not settle down/decrease in size in the next couple weeks he will let us know.         Follow Up   No follow-ups on file.  Patient was given instructions and counseling regarding his condition or for health maintenance advice. Please see specific information pulled into the AVS if appropriate.

## 2022-10-21 ENCOUNTER — TELEPHONE (OUTPATIENT)
Dept: FAMILY MEDICINE CLINIC | Facility: CLINIC | Age: 32
End: 2022-10-21

## 2022-10-21 NOTE — TELEPHONE ENCOUNTER
Caller: Elijah Fisher    Relationship: Self    Best call back number: [phone]    What medication are you requesting: SOMETHING FOR SINUS INFECTION    What are your current symptoms: THICK YELLOW SNOT, PRESSURE IN EYE AREA    How long have you been experiencing symptoms:     Have you had these symptoms before:    [x] Yes  [] No    Have you been treated for these symptoms before:   [x] Yes  [] No    If a prescription is needed, what is your preferred pharmacy and phone number: [facility] [id number] [address] AT [address] [place] [address] [phone] [facility] [phone] FX     Additional notes:PATIENT CALLED AND SAID THAT HE THINKS HE HAS A SINUS INFECTION. PATIENT WOULD LIKE FOR SOMETHING TO BE CALLED IN TO HIS PHARMACY FOR A SINUS INFECTION.PLEASE CALL AND ADVISE THE PATIENT OF WHAT TO DO NEXT.

## 2022-10-21 NOTE — TELEPHONE ENCOUNTER
Called to advise that he had to be seen to receive treatment and we have no availability today. Pt needs to go to UC or schedule appointment for next week. Hub to read/inform patient. Thanks

## 2023-03-22 ENCOUNTER — TELEPHONE (OUTPATIENT)
Dept: FAMILY MEDICINE CLINIC | Facility: CLINIC | Age: 33
End: 2023-03-22

## 2023-03-22 NOTE — TELEPHONE ENCOUNTER
Caller: Elijah Fisher    Relationship to patient: Self    Best call back number: [phone]    Date of positive COVID19 test:3/22 HOME EST     COVID19 symptoms: SORE THROAT,HEADACHE,CHILLS,CONGESTION     Additional information or concerns: PATIENT WAS WONDERING IF  COULD PRESCRIBE PAXLOVID     What is the patients preferred pharmacy:   [facility] [id number] [address] AT [address] [place] [address] [phone] [place] [phone]

## 2023-03-23 ENCOUNTER — TELEMEDICINE (OUTPATIENT)
Dept: FAMILY MEDICINE CLINIC | Facility: TELEHEALTH | Age: 33
End: 2023-03-23
Payer: COMMERCIAL

## 2023-03-23 DIAGNOSIS — U07.1 COVID-19: Primary | ICD-10-CM

## 2023-03-23 PROCEDURE — 99213 OFFICE O/P EST LOW 20 MIN: CPT | Performed by: NURSE PRACTITIONER

## 2023-03-23 NOTE — PROGRESS NOTES
CHIEF COMPLAINT  Chief Complaint   Patient presents with   • Covid-19 Home Monitoring Video Visit     Tested postive yesterday. Symptoms started Tuesday ( 2 days )    • Headache   • Abdominal Pain   • Sore Throat   • Nasal Congestion         HPI  Elijah Fisher is a 32 y.o. male  presents with complaint of 3 day h/o sore throat, headache, mild abdominal pain, and nasal congestion. He tested positive for Covid 19 yesterday. He is requesting Paxlovid. He reports no cough, shortness of breath or wheezing. He has been vaxed and boostered.     Review of Systems   Constitutional: Positive for activity change (decreased), appetite change (devreased) and fatigue. Negative for chills and fever.   HENT: Positive for congestion and sore throat.    Respiratory: Negative.    Cardiovascular: Negative.    Gastrointestinal: Positive for abdominal pain (mild). Negative for diarrhea, nausea and vomiting.   Musculoskeletal: Positive for myalgias.   Skin: Negative.    Neurological: Positive for headaches.   Hematological: Negative.    Psychiatric/Behavioral: Negative.        Past Medical History:   Diagnosis Date   • Abdominal bloating        Family History   Problem Relation Age of Onset   • Arthritis Mother    • Hyperlipidemia Father    • Colon polyps Father    • Malig Hyperthermia Neg Hx        Social History     Socioeconomic History   • Marital status:    Tobacco Use   • Smoking status: Every Day     Packs/day: 0.25     Types: Cigarettes   • Smokeless tobacco: Never   • Tobacco comments:     weekends   Vaping Use   • Vaping Use: Never used   Substance and Sexual Activity   • Alcohol use: Yes     Alcohol/week: 7.0 standard drinks     Types: 7 Cans of beer per week     Comment: weekends   • Drug use: No   • Sexual activity: Yes     Partners: Female         There were no vitals taken for this visit.    PHYSICAL EXAM  Physical Exam   Constitutional: He is oriented to person, place, and time. He appears well-developed and  well-nourished. He does not have a sickly appearance. He does not appear ill. No distress.   HENT:   Head: Normocephalic and atraumatic.   Mouth/Throat: Mouth/Lips are normal.  Mild oral pharyngeal redness no exudate or swelling.     Neurological: He is alert and oriented to person, place, and time.   Psychiatric: He has a normal mood and affect.   Vitals reviewed.      Results for orders placed or performed in visit on 04/04/22   Lipid Panel    Specimen: Blood    Blood  Manual Differen   Result Value Ref Range    Total Cholesterol 176 100 - 199 mg/dL    Triglycerides 94 0 - 149 mg/dL    HDL Cholesterol 66 >39 mg/dL    VLDL Cholesterol Marvin 17 5 - 40 mg/dL    LDL Chol Calc ([facility]) 93 0 - 99 mg/dL   Comprehensive Metabolic Panel    Specimen: Blood    Blood  Manual Differen   Result Value Ref Range    Glucose 95 65 - 99 mg/dL    BUN 13 6 - 20 mg/dL    Creatinine 0.86 0.76 - 1.27 mg/dL    EGFR Result 118 >59 mL/min/1.73    BUN/Creatinine Ratio 15 9 - 20    Sodium 140 134 - 144 mmol/L    Potassium 4.4 3.5 - 5.2 mmol/L    Chloride 104 96 - 106 mmol/L    Total CO2 22 20 - 29 mmol/L    Calcium 9.7 8.7 - 10.2 mg/dL    Total Protein 7.2 6.0 - 8.5 g/dL    Albumin 4.9 4.0 - 5.0 g/dL    Globulin 2.3 1.5 - 4.5 g/dL    A/G Ratio 2.1 1.2 - 2.2    Total Bilirubin 0.7 0.0 - 1.2 mg/dL    Alkaline Phosphatase 71 44 - 121 IU/L    AST (SGOT) 23 0 - 40 IU/L    ALT (SGPT) 25 0 - 44 IU/L   CBC & Differential    Specimen: Blood    Blood  Manual Differen   Result Value Ref Range    WBC 9.1 3.4 - 10.8 x10E3/uL    RBC 5.07 4.14 - 5.80 x10E6/uL    Hemoglobin 15.4 13.0 - 17.7 g/dL    Hematocrit 46.1 37.5 - 51.0 %    MCV 91 79 - 97 fL    MCH 30.4 26.6 - 33.0 pg    MCHC 33.4 31.5 - 35.7 g/dL    RDW 12.1 11.6 - 15.4 %    Platelets 229 150 - 450 x10E3/uL    Neutrophil Rel % 63 Not Estab. %    Lymphocyte Rel % 28 Not Estab. %    Monocyte Rel % 6 Not Estab. %    Eosinophil Rel % 2 Not Estab. %    Basophil Rel % 1 Not Estab. %    Neutrophils Absolute 5.7  1.4 - 7.0 x10E3/uL    Lymphocytes Absolute 2.6 0.7 - 3.1 x10E3/uL    Monocytes Absolute 0.5 0.1 - 0.9 x10E3/uL    Eosinophils Absolute 0.2 0.0 - 0.4 x10E3/uL    Basophils Absolute 0.1 0.0 - 0.2 x10E3/uL    Immature Granulocyte Rel % 0 Not Estab. %    Immature Grans Absolute 0.0 0.0 - 0.1 x10E3/uL       Diagnoses and all orders for this visit:    1. COVID-19 (Primary)  -     Nirmatrelvir&Ritonavir 300/100 (PAXLOVID) 20 x 150 MG & 10 x 100MG tablet therapy pack tablet; Take 3 tablets by mouth 2 (Two) Times a Day for 5 days.  Dispense: 30 tablet; Refill: 0  -     QUESTIONNAIRE SERIES    Discussed FDA and EUA facts.   Discussed the side effects of Paxlovid.   Advised to hold on taking Paxlovid at present due to decreased risk of hospitalization or severe illness.   Patient agrees.   Fact sheet emailed to patient.   Instructed to follow up in ED for worsening s/s    Vitamin C,D and zinc.   Increase fluids and rest.   Warm salt water gargles and warm tea prn sore throat.   IBU with food every 6 hours as directed.   Quarantine as directed.     The use of a video visit has been reviewed with the patient and verbal informd consent has een obtained. Myself and Elijah Fisher participated in this visit. The patient is located in [address] [place] [address]. I am located in [place], [place]. Mychart and Zoom were utilized. I spent 15  minutes in the patient's chart for this visit.           Bita Silva, APRN  03/23/2023  08:26 EDT

## 2023-03-23 NOTE — PATIENT INSTRUCTIONS
10 Things You Can Do to Manage Your COVID-19 Symptoms at Home  If you have possible or confirmed COVID-19  Stay home except to get medical care.  Monitor your symptoms carefully. If your symptoms get worse, call your healthcare provider immediately.  Get rest and stay hydrated.  If you have a medical appointment, call the healthcare provider ahead of time and tell them that you have or may have COVID-19.  For medical emergencies, call 911 and notify the dispatch personnel that you have or may have COVID-19.  Cover your cough and sneezes with a tissue or use the inside of your elbow.  Wash your hands often with soap and water for at least 20 seconds or clean your hands with an alcohol-based hand  that contains at least 60% alcohol.  As much as possible, stay in a specific room and away from other people in your home. Also, you should use a separate bathroom, if available. If you need to be around other people in or outside of the home, wear a mask.  Avoid sharing personal items with other people in your household, like dishes, towels, and bedding.  Clean all surfaces that are touched often, like counters, tabletops, and doorknobs. Use household cleaning sprays or wipes according to the label instructions.  cdc.gov/coronavirus  07/16/2021  This information is not intended to replace advice given to you by your health care provider. Make sure you discuss any questions you have with your health care provider.  Document Revised: 11/02/2022 Document Reviewed: 11/02/2022  [employer] Patient Education © 2022 [employer]

## 2023-05-25 ENCOUNTER — OFFICE VISIT (OUTPATIENT)
Dept: FAMILY MEDICINE CLINIC | Facility: CLINIC | Age: 33
End: 2023-05-25
Payer: COMMERCIAL

## 2023-05-25 VITALS
DIASTOLIC BLOOD PRESSURE: 81 MMHG | TEMPERATURE: 98.2 F | HEIGHT: 70 IN | BODY MASS INDEX: 20.27 KG/M2 | SYSTOLIC BLOOD PRESSURE: 135 MMHG | OXYGEN SATURATION: 100 % | WEIGHT: 141.6 LBS | HEART RATE: 76 BPM

## 2023-05-25 DIAGNOSIS — J03.90 TONSILLITIS: Primary | ICD-10-CM

## 2023-05-25 PROCEDURE — 99213 OFFICE O/P EST LOW 20 MIN: CPT | Performed by: FAMILY MEDICINE

## 2023-05-25 NOTE — PROGRESS NOTES
"Chief Complaint  SWOLLEN TONSIL (RIGHT SWOLLEN TONSIL HAS STREP ON 4/23/23)    Subjective        Elijah Fisher presents to [facility]  History of Present Illness     About 2 months ago he had COVID-19, mild symptoms with a sore throat.  Got better.  About 1 month ago he was diagnosed with strep throat and urgent care center.  His wife had also.  They believe their 2-year-old son had it.  He states he had a positive strep test.  He was given azithromycin 5-day pack.  Felt better.  But now he has a continuing swollen tonsil that bothers him a little bit.  Does not hurt.  No fever.  No chills.  He otherwise feels well.  He is leaving for vacation next week and is concerned about something happening.    Objective   Vital Signs:  /81   Pulse 76   Temp 98.2 °F (36.8 °C) (Temporal)   Ht 177.8 cm (70\")   Wt 64.2 kg (141 lb 9.6 oz)   SpO2 100%   BMI 20.32 kg/m²   Estimated body mass index is 20.32 kg/m² as calculated from the following:    Height as of this encounter: 177.8 cm (70\").    Weight as of this encounter: 64.2 kg (141 lb 9.6 oz).       BMI is within normal parameters. No other follow-up for BMI required.      Physical Exam  Vitals and nursing note reviewed.   Constitutional:       General: He is not in acute distress.     Comments: No acute distress.  Nontoxic.   HENT:      Right Ear: Tympanic membrane, ear canal and external ear normal.      Left Ear: Tympanic membrane, ear canal and external ear normal.      Nose: Nose normal.      Mouth/Throat:      Pharynx: Oropharynx is clear. No oropharyngeal exudate or posterior oropharyngeal erythema.      Comments: The right tonsil is minimally enlarged.  The left.  However no abnormal pigmentation, no erythema.  No ulceration.  No abnormal findings otherwise.  Uvula midline.  Eyes:      General: No scleral icterus.        Right eye: No discharge.         Left eye: No discharge.      Conjunctiva/sclera: Conjunctivae normal.   Neck: "      Comments: There are a couple of very tiny posterior cervical chain swollen lymph nodes.  Not abnormal.  No anterior lymphadenopathy.  Cardiovascular:      Rate and Rhythm: Normal rate.   Pulmonary:      Effort: Pulmonary effort is normal. No respiratory distress.      Breath sounds: Normal breath sounds. No stridor. No wheezing or rales.   Skin:     Findings: No rash.        Result Review :                   Assessment and Plan   Diagnoses and all orders for this visit:    1. Tonsillitis (Primary)      Resolving tonsillitis.  At this time low probability recurrent infection or chronic infection.  If he has trouble into next week before vacation he will let us know.  Otherwise no treatment needed.           Follow Up   No follow-ups on file.  Patient was given instructions and counseling regarding his condition or for health maintenance advice. Please see specific information pulled into the AVS if appropriate.

## 2023-07-26 ENCOUNTER — TELEPHONE (OUTPATIENT)
Dept: FAMILY MEDICINE CLINIC | Facility: CLINIC | Age: 33
End: 2023-07-26

## 2023-07-26 NOTE — TELEPHONE ENCOUNTER
Caller: Elijah Fisher     Relationship: SELF    Best call back number: [phone]     What is your medical concern? RASH ON ARM      PATIENT IS BASICALLY WANTING DR BABCOCK TO TELL HIM IF HE NEEDS TO BE SEEN OR NOT.  HE IS NOT WANTING TO MISS WORK IF HE DOES NOT HAVE TO.    PLEASE ADVISE.

## 2023-07-26 NOTE — TELEPHONE ENCOUNTER
Needs office visit with me, other provider in office, in person urgent care visit, or MyChart urgent care video visit with a [employer] nurse practitioner online.

## 2023-09-26 ENCOUNTER — OFFICE VISIT (OUTPATIENT)
Dept: GASTROENTEROLOGY | Facility: CLINIC | Age: 33
End: 2023-09-26
Payer: COMMERCIAL

## 2023-09-26 VITALS
DIASTOLIC BLOOD PRESSURE: 82 MMHG | TEMPERATURE: 97.1 F | OXYGEN SATURATION: 98 % | HEIGHT: 70 IN | BODY MASS INDEX: 20.62 KG/M2 | HEART RATE: 77 BPM | SYSTOLIC BLOOD PRESSURE: 152 MMHG | WEIGHT: 144 LBS

## 2023-09-26 DIAGNOSIS — R14.0 BLOATING: Primary | ICD-10-CM

## 2023-09-26 PROCEDURE — 99213 OFFICE O/P EST LOW 20 MIN: CPT | Performed by: NURSE PRACTITIONER

## 2023-09-26 NOTE — PROGRESS NOTES
Chief Complaint   Patient presents with    Bloated       HPI    Elijah Fisher is a  33 y.o. male here for a follow up visit for bloating.    This patient follows with Dr. Coker, new to me.    Patient reports episodes of bloating that come on this time a year for the last 3 years.  Symptoms generally last for several months and slowly resolved.  He feels fine the rest of the year.  No identifiable dietary triggers.  He was on probiotics for a while which seemed to exacerbate things.  PPI therapy was stopped shortly after his last office visit.  He denies nausea, vomiting, dysphagia, odynophagia, poor appetite or weight loss.  He does eat a plant-based diet.  He also avoids red meat reports alpha gal allergy. He does consume chicken and fish.  Reports regular bowel movements without difficulty.  Occasionally gets looser consistency stool but denies overt diarrhea.    Additional data reviewed:    EGD 2021 - Gastritis and one nonbleeding duodenal ulcer.    C-scope 2021 - Internal hemorrhoids otherwise normal.    Past Medical History:   Diagnosis Date    Abdominal bloating        Past Surgical History:   Procedure Laterality Date    COLONOSCOPY N/A 9/21/2021    Procedure: COLONOSCOPY TO CECUM AND TERMINAL ILEUM;  Surgeon: Marcelo Coker MD;  Location: [facility] ENDOSCOPY;  Service: Gastroenterology;  Laterality: N/A;  PRE:RECTAL BLEEDING, FAMILY HX OF POLYPS  POST:INTERNAL HEMORRHOIDS    ENDOSCOPY N/A 9/21/2021    Procedure: ESOPHAGOGASTRODUODENOSCOPY WITH COLD BIOPSIES;  Surgeon: Marcelo Coker MD;  Location: [facility] ENDOSCOPY;  Service: Gastroenterology;  Laterality: N/A;  PRE:ABDOMINAL DISCOMFORT, BLOATING  POST: GASTRITIS, DUODENAL BULB ULCER    MOUTH SURGERY         Scheduled Meds:     Continuous Infusions: No current facility-administered medications for this visit.      PRN Meds:     Allergies   Allergen Reactions    Amoxicillin Other (See Comments)     Childhood reaction       Social History     Socioeconomic History     Marital status:    Tobacco Use    Smoking status: Every Day     Packs/day: 0.25     Types: Cigarettes    Smokeless tobacco: Never    Tobacco comments:     weekends   Vaping Use    Vaping Use: Never used   Substance and Sexual Activity    Alcohol use: Yes     Alcohol/week: 7.0 standard drinks     Types: 7 Cans of beer per week     Comment: weekends    Drug use: No    Sexual activity: Yes     Partners: Female       Family History   Problem Relation Age of Onset    Arthritis Mother     Hyperlipidemia Father     Colon polyps Father     Malig Hyperthermia Neg Hx        Review of Systems   Gastrointestinal:         + bloating      Vitals:    09/26/23 1312   BP: 152/82   Pulse: 77   Temp: 97.1 °F (36.2 °C)   SpO2: 98%       Physical Exam  Constitutional:       Appearance: He is well-developed.   Abdominal:      General: Bowel sounds are normal. There is no distension.      Palpations: Abdomen is soft. There is no mass.      Tenderness: There is no abdominal tenderness. There is no guarding.      Hernia: No hernia is present.   Skin:     General: Skin is warm and dry.      Capillary Refill: Capillary refill takes less than 2 seconds.   Neurological:      Mental Status: He is alert and oriented to person, place, and time.   Psychiatric:         Behavior: Behavior normal.     Assessment    Diagnoses and all orders for this visit:    1. Bloating (Primary)  Overview:  Added automatically from request for surgery [id number]    Orders:  -     Food Allergy Profile    Plan    Recommend food allergy profile today  Obtain breath testing to rule out small intestinal bacterial overgrowth  Continue high-fiber diet  Further recommendations and follow-up pending breath testing and serology         BLAYNE Segovia  [facility]  [address] [place] [address]  Office: [phone]

## 2023-09-27 ENCOUNTER — TELEPHONE (OUTPATIENT)
Dept: GASTROENTEROLOGY | Facility: CLINIC | Age: 33
End: 2023-09-27
Payer: COMMERCIAL

## 2023-09-27 NOTE — TELEPHONE ENCOUNTER
----- Message from BLAYNE Segovia sent at 9/26/2023  1:36 PM EDT -----  He needs a SIBO breath testing kit mailed to his home

## 2023-09-29 LAB
CLAM IGE QN: <0.1 KU/L
CODFISH IGE QN: <0.1 KU/L
CONV CLASS DESCRIPTION: ABNORMAL
CORN IGE QN: <0.1 KU/L
COW MILK IGE QN: 1.15 KU/L
EGG WHITE IGE QN: <0.1 KU/L
PEANUT IGE QN: <0.1 KU/L
SCALLOP IGE QN: <0.1 KU/L
SESAME SEED IGE QN: <0.1 KU/L
SHRIMP IGE QN: <0.1 KU/L
SOYBEAN IGE QN: <0.1 KU/L
WALNUT IGE QN: <0.1 KU/L
WHEAT IGE QN: <0.1 KU/L

## 2023-09-29 NOTE — PROGRESS NOTES
Please inform the patient food allergy profile shows a moderate sensitivity to cows milk consider lactose-free diet.

## 2023-10-18 ENCOUNTER — TELEPHONE (OUTPATIENT)
Dept: GASTROENTEROLOGY | Facility: CLINIC | Age: 33
End: 2023-10-18
Payer: COMMERCIAL

## 2023-10-18 NOTE — TELEPHONE ENCOUNTER
Caller: Elijah Fisher    Relationship: Self    Best call back number: [phone]    What is the best time to reach you: ANYTIME    Who are you requesting to speak with (clinical staff, provider,  specific staff member): CLINICAL STAFF     What was the call regarding: PT MISSED A CALL FROM THE OFFICE FOR TEST RESULTS AND HE'S CALLING BACK

## 2023-10-20 ENCOUNTER — TELEPHONE (OUTPATIENT)
Dept: GASTROENTEROLOGY | Facility: CLINIC | Age: 33
End: 2023-10-20
Payer: COMMERCIAL

## 2023-10-20 ENCOUNTER — DOCUMENTATION (OUTPATIENT)
Dept: GASTROENTEROLOGY | Facility: CLINIC | Age: 33
End: 2023-10-20
Payer: COMMERCIAL

## 2023-12-05 ENCOUNTER — TELEPHONE (OUTPATIENT)
Dept: GASTROENTEROLOGY | Facility: CLINIC | Age: 33
End: 2023-12-05

## 2023-12-05 NOTE — TELEPHONE ENCOUNTER
Caller: Elijah Fisher    Relationship: Self    Best call back number: [phone]    Requested Prescriptions: XIFAXAN  Requested Prescriptions      No prescriptions requested or ordered in this encounter        Pharmacy where request should be sent:  [facility] [id number] [address] [facility] [phone] [facility] [phone] FX     Last office visit with prescribing clinician: 9/26/2023   Last telemedicine visit with prescribing clinician: Visit date not found   Next office visit with prescribing clinician: Visit date not found     Additional details provided by patient: PT REQUESTS SECOND ROUND OF PRESCRIPTION. HE HAS COMPLETED ONE ROUND AND WAS ADVISED IT WOULD PROBABLY TAKE TWO ROUNDS TO BE SUCCESSFUL. ALSO, PT REQUESTS CALL BACK TO DISCUSS TEST RESULTS IN DETAIL. HE WAS NEVER ADVISED. PLEASE CONTACT PT TO ASSIST.     Does the patient have less than a 3 day supply:  [x] Yes  [] No    Would you like a call back once the refill request has been completed: [x] Yes [] No    If the office needs to give you a call back, can they leave a voicemail: [x] Yes [] No    Lou Ibrahim Rep   12/05/23 09:36 EST

## 2023-12-05 NOTE — TELEPHONE ENCOUNTER
Dr Coker,  This pt was last seen by Carola and she ordered a SIBO test, sibo results have been scanned into his chart under media, she has not gotten back to him concerning the results, can you review and advise?    He is also asking for another round of xifaxan.  He said that Carola told him it may take more than one round to get rid of SIBO.  He said, over all his symptoms had improved after the first round.  He has been on the Fodmap diet,  and he has started to add some foods back.  He said he is tolerating things like broccoli and brussels sprouts, but legumes create gas and bloating and for this reason he is asking for another round of xifaxan.

## 2023-12-06 NOTE — TELEPHONE ENCOUNTER
Tell him that his SIBO test suggested that he does have small bowel bacterial overgrowth.  Since his Xifaxan did help please send in a prescription for another round of Xifaxan 550 mg 1 p.o. 3 times daily x 14 days, with no refills. Also have him f/u with me in the office 12/18 in the AM. Thx.[id number]       Message sent to Cassidy for appt in the am on 12/18 with Dr Coker.

## 2023-12-06 NOTE — TELEPHONE ENCOUNTER
Please call patient and offer an appointment with Dr. Coker at 9 am on 12/18/2023.       Called pt and advised of Dr Coker's note. Verb understanding.     Pt states he can make the above appt. Update sent to Cassidy.      Xifaxan 550 mg po tid #42 with no refills was escribed to pt's [facility] pharmacy.

## 2023-12-18 ENCOUNTER — OFFICE VISIT (OUTPATIENT)
Dept: GASTROENTEROLOGY | Facility: CLINIC | Age: 33
End: 2023-12-18
Payer: COMMERCIAL

## 2023-12-18 VITALS
DIASTOLIC BLOOD PRESSURE: 84 MMHG | HEART RATE: 68 BPM | BODY MASS INDEX: 21.56 KG/M2 | HEIGHT: 70 IN | SYSTOLIC BLOOD PRESSURE: 131 MMHG | WEIGHT: 150.6 LBS | TEMPERATURE: 97.7 F | OXYGEN SATURATION: 98 %

## 2023-12-18 DIAGNOSIS — Z83.719 FH: COLON POLYPS: ICD-10-CM

## 2023-12-18 DIAGNOSIS — R14.0 BLOATING: Primary | ICD-10-CM

## 2023-12-18 PROCEDURE — 99213 OFFICE O/P EST LOW 20 MIN: CPT | Performed by: INTERNAL MEDICINE

## 2023-12-18 RX ORDER — OMEPRAZOLE 40 MG/1
CAPSULE, DELAYED RELEASE ORAL
COMMUNITY

## 2023-12-18 NOTE — PROGRESS NOTES
"Chief Complaint   Patient presents with    SIBO    Bloated       History of Present Illness:   33 y.o. 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 He has had small intestinal bacterial overgrowth breath testing that came back positive. He was put on Xifaxan. There was a note in his chart that said:  \"He is also asking for another round of xifaxan.  He said that Carola told him it may take more than one round to get rid of SIBO.  He said, over all his symptoms had improved after the first round.  He has been on the Fodmap diet,  and he has started to add some foods back.  He said he is tolerating things like broccoli and brussels sprouts, but legumes create gas and bloating and for this reason he is asking for another round of xifaxan.\"  I did call in a prescription for Xifaxan 550 mg po TID (for his second round of Xifaxan for SIBO).       EGD 9/2021 - Gastritis and one nonbleeding duodenal ulcer.       C-scope 9/2021 - Internal hemorrhoids otherwise normal.       His weight has been stable. He is midway thru the second round of Xifaxan. Every summer the bloating returns. He still has bloating. He is moslty vegetarian. Legumes cause bloating. He has put back carrots, brocoli and they don't bother him. Potatoes cause bloating. He has bad days and good days. No abdominal or chest pain. No nausea or vomiting. No dairrhea or consitaption. No rectal bleeding or melena. NO fevers, chills, night sweats. Occas smokes. ETOH: 6-12 beers/weekend.  at [employer]. He exercises daily. He takes a protein supplement. He tried Beano, Gas X and no help. Eliminating legumes seems to help the most.     Past Medical History:   Diagnosis Date    Abdominal bloating        Past Surgical History:   Procedure Laterality Date    COLONOSCOPY N/A 9/21/2021    Procedure: COLONOSCOPY TO CECUM AND TERMINAL ILEUM;  Surgeon: Marcelo Coker MD;  Location: [facility] ENDOSCOPY;  " Service: Gastroenterology;  Laterality: N/A;  PRE:RECTAL BLEEDING, FAMILY HX OF POLYPS  POST:INTERNAL HEMORRHOIDS    ENDOSCOPY N/A 9/21/2021    Procedure: ESOPHAGOGASTRODUODENOSCOPY WITH COLD BIOPSIES;  Surgeon: Marcelo Coker MD;  Location: [facility] ENDOSCOPY;  Service: Gastroenterology;  Laterality: N/A;  PRE:ABDOMINAL DISCOMFORT, BLOATING  POST: GASTRITIS, DUODENAL BULB ULCER    MOUTH SURGERY           Current Outpatient Medications:     Acetaminophen (TYLENOL PO), Take  by mouth., Disp: , Rfl:     riFAXIMin (Xifaxan) 550 MG tablet, Take 1 tablet by mouth Every 8 (Eight) Hours., Disp: 42 tablet, Rfl: 0    betamethasone, augmented, (Diprolene AF) 0.05 % cream, Apply 1 application topically to the appropriate area as directed 2 (Two) Times a Day. (Patient not taking: Reported on 12/18/2023), Disp: 50 g, Rfl: 0    omeprazole (priLOSEC) 40 MG capsule, , Disp: , Rfl:     Allergies   Allergen Reactions    Amoxicillin Other (See Comments)     Childhood reaction       Family History   Problem Relation Age of Onset    Arthritis Mother     Hyperlipidemia Father     Colon polyps Father     Malig Hyperthermia Neg Hx        Social History     Socioeconomic History    Marital status:    Tobacco Use    Smoking status: Every Day     Packs/day: .25     Types: Cigarettes    Smokeless tobacco: Never    Tobacco comments:     weekends   Vaping Use    Vaping Use: Never used   Substance and Sexual Activity    Alcohol use: Yes     Alcohol/week: 7.0 standard drinks of alcohol     Types: 7 Cans of beer per week     Comment: weekends    Drug use: No    Sexual activity: Yes     Partners: Female       Review of Systems   Gastrointestinal:  Negative for abdominal pain.   All other systems reviewed and are negative.    Pertinent positives and negatives documented in the HPI and all other systems reviewed and were found to be negative.  Vitals:    12/18/23 0857   BP: 131/84   Pulse: 68   Temp: 97.7 °F (36.5 °C)   SpO2: 98%       Physical  Exam  Vitals reviewed.   Constitutional:       General: He is not in acute distress.     Appearance: Normal appearance. He is well-developed. He is not diaphoretic.   HENT:      Head: Normocephalic and atraumatic. Hair is normal.      Right Ear: Hearing, tympanic membrane, ear canal and external ear normal.      Left Ear: Hearing, tympanic membrane, ear canal and external ear normal.      Nose: Nose normal. No nasal deformity.      Mouth/Throat:      Mouth: Mucous membranes are moist. No oral lesions.      Pharynx: Uvula midline. No uvula swelling.   Eyes:      General: Lids are normal. No scleral icterus.        Right eye: No discharge.         Left eye: No discharge.      Extraocular Movements: Extraocular movements intact.      Right eye: Normal extraocular motion and no nystagmus.      Left eye: Normal extraocular motion and no nystagmus.      Conjunctiva/sclera: Conjunctivae normal.      Pupils: Pupils are equal, round, and reactive to light.   Neck:      Thyroid: No thyromegaly.      Vascular: No JVD.   Cardiovascular:      Rate and Rhythm: Normal rate and regular rhythm.      Pulses: Normal pulses.      Heart sounds: Normal heart sounds. No murmur heard.     No gallop.   Pulmonary:      Effort: Pulmonary effort is normal. No respiratory distress.      Breath sounds: Normal breath sounds. No wheezing or rales.   Chest:      Chest wall: No tenderness.   Abdominal:      General: Bowel sounds are normal. There is no distension.      Palpations: Abdomen is soft. There is no mass.      Tenderness: There is no abdominal tenderness. There is no guarding.      Hernia: No hernia is present.   Genitourinary:     Rectum: Normal. Guaiac result negative.   Musculoskeletal:         General: No tenderness or deformity. Normal range of motion.      Cervical back: Normal range of motion and neck supple.   Lymphadenopathy:      Cervical: No cervical adenopathy.   Skin:     General: Skin is warm and dry.      Findings: No rash.    Neurological:      Mental Status: He is alert and oriented to person, place, and time.      Cranial Nerves: No cranial nerve deficit.      Motor: No abnormal muscle tone.      Coordination: Coordination normal.      Deep Tendon Reflexes: Reflexes are normal and symmetric. Reflexes normal.   Psychiatric:         Mood and Affect: Mood normal.         Behavior: Behavior normal.         Thought Content: Thought content normal.         Judgment: Judgment normal.         Diagnoses and all orders for this visit:    1. Bloating (Primary)  -     Amylase  -     Lipase  -     CBC & Differential  -     Celiac Ab tTG DGP TIgA  -     Comprehensive Metabolic Panel  -     TSH    2. FH: colon polyps      Assessment:  FH (dad) colon polyps. His last colonoscopy was in 9/21.   Bloating  H/o NSAID induced duodenal ulcer      Recommendations:  Take a MVI daily.  Lactose free diet.   Decrease ETOH  Labs  F/u 2 mos  If lactose free diet doesn't work consider testing for Congenital Sucrase-Isomaltase Deficiency with the C-13 Sucrose breath test.      No follow-ups on file.    Marcelo Coker MD  12/18/2023

## 2023-12-19 LAB
ALBUMIN SERPL-MCNC: 4.9 G/DL (ref 3.5–5.2)
ALBUMIN/GLOB SERPL: 1.8 G/DL
ALP SERPL-CCNC: 82 U/L (ref 39–117)
ALT SERPL-CCNC: 25 U/L (ref 1–41)
AMYLASE SERPL-CCNC: 70 U/L (ref 28–100)
AST SERPL-CCNC: 21 U/L (ref 1–40)
BASOPHILS # BLD AUTO: 0.06 10*3/MM3 (ref 0–0.2)
BASOPHILS NFR BLD AUTO: 0.9 % (ref 0–1.5)
BILIRUB SERPL-MCNC: 0.5 MG/DL (ref 0–1.2)
BUN SERPL-MCNC: 15 MG/DL (ref 6–20)
BUN/CREAT SERPL: 13.5 (ref 7–25)
CALCIUM SERPL-MCNC: 9.4 MG/DL (ref 8.6–10.5)
CHLORIDE SERPL-SCNC: 102 MMOL/L (ref 98–107)
CO2 SERPL-SCNC: 24.8 MMOL/L (ref 22–29)
CREAT SERPL-MCNC: 1.11 MG/DL (ref 0.76–1.27)
EGFRCR SERPLBLD CKD-EPI 2021: 89.4 ML/MIN/1.73
EOSINOPHIL # BLD AUTO: 0.14 10*3/MM3 (ref 0–0.4)
EOSINOPHIL NFR BLD AUTO: 2.1 % (ref 0.3–6.2)
ERYTHROCYTE [DISTWIDTH] IN BLOOD BY AUTOMATED COUNT: 12.1 % (ref 12.3–15.4)
GLIADIN PEPTIDE IGA SER-ACNC: 7 UNITS (ref 0–19)
GLIADIN PEPTIDE IGG SER-ACNC: 3 UNITS (ref 0–19)
GLOBULIN SER CALC-MCNC: 2.7 GM/DL
GLUCOSE SERPL-MCNC: 104 MG/DL (ref 65–99)
HCT VFR BLD AUTO: 46.8 % (ref 37.5–51)
HGB BLD-MCNC: 15.3 G/DL (ref 13–17.7)
IGA SERPL-MCNC: 279 MG/DL (ref 90–386)
IMM GRANULOCYTES # BLD AUTO: 0.02 10*3/MM3 (ref 0–0.05)
IMM GRANULOCYTES NFR BLD AUTO: 0.3 % (ref 0–0.5)
LIPASE SERPL-CCNC: 23 U/L (ref 13–60)
LYMPHOCYTES # BLD AUTO: 2.45 10*3/MM3 (ref 0.7–3.1)
LYMPHOCYTES NFR BLD AUTO: 36.6 % (ref 19.6–45.3)
MCH RBC QN AUTO: 29.1 PG (ref 26.6–33)
MCHC RBC AUTO-ENTMCNC: 32.7 G/DL (ref 31.5–35.7)
MCV RBC AUTO: 89 FL (ref 79–97)
MONOCYTES # BLD AUTO: 0.43 10*3/MM3 (ref 0.1–0.9)
MONOCYTES NFR BLD AUTO: 6.4 % (ref 5–12)
NEUTROPHILS # BLD AUTO: 3.59 10*3/MM3 (ref 1.7–7)
NEUTROPHILS NFR BLD AUTO: 53.7 % (ref 42.7–76)
NRBC BLD AUTO-RTO: 0.1 /100 WBC (ref 0–0.2)
PLATELET # BLD AUTO: 335 10*3/MM3 (ref 140–450)
POTASSIUM SERPL-SCNC: 4.9 MMOL/L (ref 3.5–5.2)
PROT SERPL-MCNC: 7.6 G/DL (ref 6–8.5)
RBC # BLD AUTO: 5.26 10*6/MM3 (ref 4.14–5.8)
SODIUM SERPL-SCNC: 141 MMOL/L (ref 136–145)
TSH SERPL DL<=0.005 MIU/L-ACNC: 1.69 UIU/ML (ref 0.27–4.2)
TTG IGA SER-ACNC: <2 U/ML (ref 0–3)
TTG IGG SER-ACNC: <2 U/ML (ref 0–5)
WBC # BLD AUTO: 6.69 10*3/MM3 (ref 3.4–10.8)

## 2024-01-09 ENCOUNTER — TELEPHONE (OUTPATIENT)
Dept: GASTROENTEROLOGY | Facility: CLINIC | Age: 34
End: 2024-01-09
Payer: COMMERCIAL

## 2024-01-09 NOTE — TELEPHONE ENCOUNTER
----- Message from Marcelo Coker MD sent at 1/8/2024  4:57 PM EST -----  01/08/24       Tell him that lab work done last month all came back normal.  Please send a copy of this report report to his PCP.  [facility]

## 2024-01-09 NOTE — TELEPHONE ENCOUNTER
Pt reviewed results via [employer].     Sent pt [employer] msg advising of results. Advised to call if any questions.     Copy of report sent to PCP via [employer].

## 2024-02-14 ENCOUNTER — OFFICE VISIT (OUTPATIENT)
Dept: GASTROENTEROLOGY | Facility: CLINIC | Age: 34
End: 2024-02-14
Payer: COMMERCIAL

## 2024-02-14 VITALS
BODY MASS INDEX: 21.3 KG/M2 | HEIGHT: 70 IN | OXYGEN SATURATION: 98 % | DIASTOLIC BLOOD PRESSURE: 91 MMHG | SYSTOLIC BLOOD PRESSURE: 144 MMHG | HEART RATE: 73 BPM | TEMPERATURE: 98.6 F | WEIGHT: 148.8 LBS

## 2024-02-14 DIAGNOSIS — Z91.018 ALLERGY TO ALPHA-GAL: ICD-10-CM

## 2024-02-14 DIAGNOSIS — E73.9 LACTOSE INTOLERANCE: ICD-10-CM

## 2024-02-14 DIAGNOSIS — R14.0 BLOATING: Primary | ICD-10-CM

## 2024-02-14 DIAGNOSIS — Z83.719 FH: COLON POLYPS: ICD-10-CM

## 2024-02-14 PROCEDURE — 99214 OFFICE O/P EST MOD 30 MIN: CPT | Performed by: INTERNAL MEDICINE

## 2024-02-16 ENCOUNTER — OFFICE VISIT (OUTPATIENT)
Dept: FAMILY MEDICINE CLINIC | Facility: CLINIC | Age: 34
End: 2024-02-16
Payer: COMMERCIAL

## 2024-02-16 VITALS
RESPIRATION RATE: 14 BRPM | HEIGHT: 70 IN | OXYGEN SATURATION: 98 % | DIASTOLIC BLOOD PRESSURE: 80 MMHG | SYSTOLIC BLOOD PRESSURE: 138 MMHG | WEIGHT: 148.8 LBS | HEART RATE: 74 BPM | TEMPERATURE: 98.2 F | BODY MASS INDEX: 21.3 KG/M2

## 2024-02-16 DIAGNOSIS — L98.9 SKIN LESION: Primary | ICD-10-CM

## 2024-02-16 PROCEDURE — 99213 OFFICE O/P EST LOW 20 MIN: CPT | Performed by: FAMILY MEDICINE

## 2024-07-31 ENCOUNTER — OFFICE VISIT (OUTPATIENT)
Dept: FAMILY MEDICINE CLINIC | Facility: CLINIC | Age: 34
End: 2024-07-31
Payer: COMMERCIAL

## 2024-07-31 VITALS
BODY MASS INDEX: 21.35 KG/M2 | DIASTOLIC BLOOD PRESSURE: 80 MMHG | OXYGEN SATURATION: 99 % | TEMPERATURE: 97.5 F | HEART RATE: 78 BPM | SYSTOLIC BLOOD PRESSURE: 134 MMHG | WEIGHT: 148.8 LBS

## 2024-07-31 DIAGNOSIS — R10.31 RIGHT INGUINAL PAIN: Primary | ICD-10-CM

## 2024-07-31 PROCEDURE — 99213 OFFICE O/P EST LOW 20 MIN: CPT | Performed by: FAMILY MEDICINE

## 2024-07-31 RX ORDER — METHOCARBAMOL 750 MG/1
750 TABLET, FILM COATED ORAL 4 TIMES DAILY
COMMUNITY
Start: 2024-07-27

## 2024-07-31 RX ORDER — IBUPROFEN 200 MG
200 TABLET ORAL EVERY 6 HOURS PRN
COMMUNITY

## 2024-07-31 NOTE — PROGRESS NOTES
"Chief Complaint  Testicle Pain    Subjective        Elijah Fisher presents to [facility] PRIMARY CARE  History of Present Illness    A couple of weeks ago he bought some new exercise equipment was doing some intense lifting.  He started having some discomfort on the right side of the scrotum that would radiate up to the inguinal canal and also radiate around to the right buttocks.  3 out of 10 pain.  Went to the emergency room.  Testicular ultrasound and scrotal ultrasound unremarkable.  Normal epididymides.  Pain persist.  But it is improved with ibuprofen.  Remote history of duodenal ulcer.  No urinary significant symptoms.  No dysesthesias.  No numbness.    Objective   Vital Signs:  /80   Pulse 78   Temp 97.5 °F (36.4 °C) (Oral)   Wt 67.5 kg (148 lb 12.8 oz)   SpO2 99%   BMI 21.35 kg/m²   Estimated body mass index is 21.35 kg/m² as calculated from the following:    Height as of 2/16/24: 177.8 cm (70\").    Weight as of this encounter: 67.5 kg (148 lb 12.8 oz).       BMI is within normal parameters. No other follow-up for BMI required.      Physical Exam  Constitutional:       Appearance: He is not ill-appearing.   Genitourinary:     Comments: No testicular nodules or tenderness.  The epididymides are unremarkable.  No inguinal hernia.  Good femoral pulses.  No inguinal adenopathy.  The gluteal exam is unremarkable.  No rash.       Result Review :                     Assessment and Plan     Diagnoses and all orders for this visit:    1. Right inguinal pain (Primary)      Right groin pain.  Suggestive of referred pain from a gluteal or sacral injury or inflammatory process.  Symptoms are mild to moderate.  Improved with NSAIDs.  Scrotal and itching unremarkable.  At this time I recommend conservative therapy with ibuprofen 600 mg 3 times a day as needed and Pepcid 20 mg twice a day.  Remote history of duodenal ulcers.  If not proving in 3 to 4 weeks, I would recommend either sports " medicine and/or physical therapy.       Follow Up     No follow-ups on file.  Patient was given instructions and counseling regarding his condition or for health maintenance advice. Please see specific information pulled into the AVS if appropriate.

## 2024-07-31 NOTE — PATIENT INSTRUCTIONS
I believe your pain is likely related to the weightlifting, either sacral nerve or gluteal muscle issue.  At this point I recommend over-the-counter ibuprofen 3 tablets up to 3 times a day with Pepcid 20 mg twice a day, all over-the-counter.  If symptoms are persisting a week or 2, I would recommend either sports medicine or physical therapy.

## 2024-09-12 ENCOUNTER — OFFICE VISIT (OUTPATIENT)
Dept: FAMILY MEDICINE CLINIC | Facility: CLINIC | Age: 34
End: 2024-09-12
Payer: COMMERCIAL

## 2024-09-12 VITALS
TEMPERATURE: 98.3 F | OXYGEN SATURATION: 99 % | DIASTOLIC BLOOD PRESSURE: 76 MMHG | WEIGHT: 149.1 LBS | BODY MASS INDEX: 21.35 KG/M2 | RESPIRATION RATE: 16 BRPM | HEIGHT: 70 IN | HEART RATE: 65 BPM | SYSTOLIC BLOOD PRESSURE: 132 MMHG

## 2024-09-12 DIAGNOSIS — M54.6 ACUTE BILATERAL THORACIC BACK PAIN: Primary | ICD-10-CM

## 2024-09-12 PROCEDURE — 99213 OFFICE O/P EST LOW 20 MIN: CPT | Performed by: FAMILY MEDICINE

## 2024-09-12 NOTE — PROGRESS NOTES
"Chief Complaint  Pain (Skin on stomach and back are sensitive to the touch. Started 2 weeks ago. Comes and goes and might be spreading to the sides. Sore muscles in back too.) and Tingling    Subjective        Elijah Fisher presents to [facility] PR[facility]  Pain        Mid to lower thoracic discomfort.  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  He also whacked his back on a rock.  Since then has had some mild pain in that area.  In a 1 out of 10 discomfort coming around bilateral rib cage.  Mostly tingling.  Not painful.  Keeping him up at night but mostly he is worried about it.  He had some stressors.  No rash.  No fever.  No chills.  No severe headache.  He does get headaches but they are unchanged and normal for him.  No focal neurological deficits.  No lower extremity weakness numbness or troubles with coordination.  He does get frequent tick bites, he works outside.  But no rashes.  No flu symptoms.  And he otherwise feels well.    Objective   Vital Signs:  /76 (BP Location: Left arm, Patient Position: Sitting, Cuff Size: Adult)   Pulse 65   Temp 98.3 °F (36.8 °C) (Oral)   Resp 16   Ht 177.8 cm (70\")   Wt 67.6 kg (149 lb 1.6 oz)   SpO2 99%   BMI 21.39 kg/m²   Estimated body mass index is 21.39 kg/m² as calculated from the following:    Height as of this encounter: 177.8 cm (70\").    Weight as of this encounter: 67.6 kg (149 lb 1.6 oz).    BMI is within normal parameters. No other follow-up for BMI required.      Physical Exam  Constitutional:       Appearance: He is not ill-appearing.   Cardiovascular:      Rate and Rhythm: Normal rate.   Pulmonary:      Effort: Pulmonary effort is normal.   Abdominal:      General: There is no distension.      Palpations: There is no mass.      Tenderness: There is no abdominal tenderness. There is no guarding or rebound.      Hernia: No hernia is present.   Musculoskeletal:         General: No swelling, tenderness or " deformity.      Comments: He has a small abrasion at his mid to lower thoracic spine midline.  There is no pain to palpation.  No pain to percussion.  No pain with rib compression.  Unremarkable exam.   Skin:     Findings: No rash.        Result Review :                Assessment and Plan   Diagnoses and all orders for this visit:    1. Acute bilateral thoracic back pain (Primary)    Musculoskeletal thoracic back pain with probable referred discomfort.  At this time no likely severe pathology.  No constitutional symptoms.  No evidence of trans myelitis clinically.  No evidence of of bilateral shingles.  I am holding off an MRI of the thoracic spine.  Holding off any type of serology.  However if symptoms are worsening, or otherwise concerning he will return for further evaluation.  Should be self-limiting.         Follow Up   No follow-ups on file.  Patient was given instructions and counseling regarding his condition or for health maintenance advice. Please see specific information pulled into the AVS if appropriate.

## 2024-12-12 NOTE — PROGRESS NOTES
Specialty Pharmacy    PA approved for Xifaxan  Key: [id number]  PA Case: [id number], Status: Approved, Coverage Starts on: 10/20/2023 12:00:00 AM, Coverage Ends on: 10/19/2024 12:00:00 AM  $0 copay  Left voicemail for pharmacy  Sent patient a [employer] message       Melissa Pete  Specialty Pharmacy Technician         hide

## 2025-07-21 ENCOUNTER — OFFICE VISIT (OUTPATIENT)
Dept: FAMILY MEDICINE CLINIC | Facility: CLINIC | Age: 35
End: 2025-07-21
Payer: COMMERCIAL

## 2025-07-21 VITALS
SYSTOLIC BLOOD PRESSURE: 124 MMHG | WEIGHT: 145.1 LBS | BODY MASS INDEX: 20.77 KG/M2 | HEART RATE: 65 BPM | DIASTOLIC BLOOD PRESSURE: 72 MMHG | HEIGHT: 70 IN | OXYGEN SATURATION: 99 %

## 2025-07-21 DIAGNOSIS — L30.9 DERMATITIS: Primary | ICD-10-CM

## 2025-07-21 DIAGNOSIS — Z00.00 HEALTH CARE MAINTENANCE: ICD-10-CM

## 2025-07-21 PROCEDURE — 99213 OFFICE O/P EST LOW 20 MIN: CPT | Performed by: FAMILY MEDICINE

## 2025-07-21 RX ORDER — CETIRIZINE HYDROCHLORIDE 5 MG/1
5 TABLET ORAL DAILY
COMMUNITY

## 2025-07-21 NOTE — PROGRESS NOTES
"Chief Complaint  irritated skin (Butt area little bit of blood-intermittent about 3 wks ago)    Subjective        Elijah Fisher presents to [facility] PR[facility]  History of Present Illness    Small area of irritation at the gluteal cleft.  He notes some blood on the tissue paper a week or 2 ago.  Worse with heat and sweat.  He works outdoors.  No pain.  No purulent drainage.  Mild symptoms.    Objective   Vital Signs:  /72 (BP Location: Right arm, Patient Position: Sitting, Cuff Size: Adult)   Pulse 65   Ht 177.8 cm (70\")   Wt 65.8 kg (145 lb 1.6 oz)   SpO2 99%   BMI 20.82 kg/m²   Estimated body mass index is 20.82 kg/m² as calculated from the following:    Height as of this encounter: 177.8 cm (70\").    Weight as of this encounter: 65.8 kg (145 lb 1.6 oz).    BMI is within normal parameters. No other follow-up for BMI required.      Physical Exam  Constitutional:       Appearance: He is not ill-appearing.   Genitourinary:     Comments: At the gluteal cleft there is some very faint skin crease erosion.  Minimal erythema.  No bleeding.  No purulent drainage.  No palpable abscess.  No pilonidal cyst.  No crusting or impetiginous findings.  External anal exam unremarkable.       Result Review :                Assessment and Plan   Diagnoses and all orders for this visit:    1. Dermatitis (Primary)    2. Health care maintenance  -     CBC & Differential; Future  -     Comprehensive Metabolic Panel; Future  -     Lipid Panel; Future  -     Urinalysis With Microscopic If Indicated (No Culture) - Urine, Clean Catch; Future      Intertriginous dermatitis.  Very mild.  Likely irritant.  He is using Aquaphor.  Continue this.  Can add over-the-counter 1% hydrocortisone cream.  If this not helping he could also try over-the-counter Lamisil cream.  If not improving he will let me know.  Otherwise I will see him back in about 6 months for annual complete physical examination with lab work prior.   "     Follow Up   No follow-ups on file.  Patient was given instructions and counseling regarding his condition or for health maintenance advice. Please see specific information pulled into the AVS if appropriate.

## (undated) DEVICE — BITEBLOCK OMNI BLOC

## (undated) DEVICE — CANN O2 ETCO2 FITS ALL CONN CO2 SMPL A/ 7IN DISP LF

## (undated) DEVICE — TUBING, SUCTION, 1/4" X 10', STRAIGHT: Brand: MEDLINE

## (undated) DEVICE — SENSR O2 OXIMAX FNGR A/ 18IN NONSTR

## (undated) DEVICE — FRCP BX RADJAW4 NDL 2.8 240CM LG OG BX40

## (undated) DEVICE — LN SMPL CO2 SHTRM SD STREAM W/M LUER

## (undated) DEVICE — KT ORCA ORCAPOD DISP STRL

## (undated) DEVICE — ADAPT CLN BIOGUARD AIR/H2O DISP